# Patient Record
Sex: MALE | Race: BLACK OR AFRICAN AMERICAN | NOT HISPANIC OR LATINO | Employment: UNEMPLOYED | URBAN - METROPOLITAN AREA
[De-identification: names, ages, dates, MRNs, and addresses within clinical notes are randomized per-mention and may not be internally consistent; named-entity substitution may affect disease eponyms.]

---

## 2017-01-21 ENCOUNTER — HOSPITAL ENCOUNTER (OUTPATIENT)
Facility: HOSPITAL | Age: 52
Discharge: HOME | End: 2017-01-23
Attending: EMERGENCY MEDICINE | Admitting: PSYCHIATRY & NEUROLOGY
Payer: MEDICAID

## 2017-01-21 DIAGNOSIS — I65.23 BILATERAL CAROTID ARTERY STENOSIS: ICD-10-CM

## 2017-01-21 DIAGNOSIS — I63.9 ISCHEMIC STROKE: Primary | ICD-10-CM

## 2017-01-21 DIAGNOSIS — I63.9 STROKE: ICD-10-CM

## 2017-01-21 LAB
ABO + RH BLD: NORMAL
ALBUMIN SERPL BCP-MCNC: 3.7 G/DL
ALP SERPL-CCNC: 109 U/L
ALT SERPL W/O P-5'-P-CCNC: 34 U/L
ANION GAP SERPL CALC-SCNC: 12 MMOL/L
AST SERPL-CCNC: 58 U/L
BASOPHILS # BLD AUTO: 0.01 K/UL
BASOPHILS NFR BLD: 0.2 %
BILIRUB SERPL-MCNC: 0.3 MG/DL
BLD GP AB SCN CELLS X3 SERPL QL: NORMAL
BUN SERPL-MCNC: 12 MG/DL
CALCIUM SERPL-MCNC: 8.8 MG/DL
CHLORIDE SERPL-SCNC: 109 MMOL/L
CHOLEST/HDLC SERPL: 4.4 {RATIO}
CO2 SERPL-SCNC: 19 MMOL/L
CREAT SERPL-MCNC: 1.1 MG/DL
CREAT SERPL-MCNC: 1.2 MG/DL (ref 0.5–1.4)
DIFFERENTIAL METHOD: ABNORMAL
EOSINOPHIL # BLD AUTO: 0.1 K/UL
EOSINOPHIL NFR BLD: 1.7 %
ERYTHROCYTE [DISTWIDTH] IN BLOOD BY AUTOMATED COUNT: 11.4 %
EST. GFR  (AFRICAN AMERICAN): >60 ML/MIN/1.73 M^2
EST. GFR  (NON AFRICAN AMERICAN): >60 ML/MIN/1.73 M^2
GLUCOSE SERPL-MCNC: 112 MG/DL
HCT VFR BLD AUTO: 41.1 %
HDL/CHOLESTEROL RATIO: 23 %
HDLC SERPL-MCNC: 161 MG/DL
HDLC SERPL-MCNC: 37 MG/DL
HGB BLD-MCNC: 14.4 G/DL
INR PPP: 1
LDLC SERPL CALC-MCNC: 92.4 MG/DL
LYMPHOCYTES # BLD AUTO: 1.9 K/UL
LYMPHOCYTES NFR BLD: 28.3 %
MCH RBC QN AUTO: 33.2 PG
MCHC RBC AUTO-ENTMCNC: 35 %
MCV RBC AUTO: 95 FL
MONOCYTES # BLD AUTO: 0.6 K/UL
MONOCYTES NFR BLD: 8.7 %
NEUTROPHILS # BLD AUTO: 4.1 K/UL
NEUTROPHILS NFR BLD: 60.9 %
NONHDLC SERPL-MCNC: 124 MG/DL
PLATELET # BLD AUTO: 242 K/UL
PMV BLD AUTO: 10.4 FL
POC PTINR: 1 (ref 0.9–1.2)
POC PTWBT: 12.4 SEC (ref 9.7–14.3)
POCT GLUCOSE: 104 MG/DL (ref 70–110)
POTASSIUM SERPL-SCNC: 3.7 MMOL/L
PROT SERPL-MCNC: 7.7 G/DL
PROTHROMBIN TIME: 10.5 SEC
RBC # BLD AUTO: 4.34 M/UL
SAMPLE: NORMAL
SAMPLE: NORMAL
SODIUM SERPL-SCNC: 140 MMOL/L
TRIGL SERPL-MCNC: 158 MG/DL
TSH SERPL DL<=0.005 MIU/L-ACNC: 1.15 UIU/ML
WBC # BLD AUTO: 6.65 K/UL

## 2017-01-21 PROCEDURE — 99285 EMERGENCY DEPT VISIT HI MDM: CPT | Mod: ,,, | Performed by: EMERGENCY MEDICINE

## 2017-01-21 PROCEDURE — 27000221 HC OXYGEN, UP TO 24 HOURS

## 2017-01-21 PROCEDURE — 93005 ELECTROCARDIOGRAM TRACING: CPT

## 2017-01-21 PROCEDURE — 83036 HEMOGLOBIN GLYCOSYLATED A1C: CPT

## 2017-01-21 PROCEDURE — 82565 ASSAY OF CREATININE: CPT

## 2017-01-21 PROCEDURE — 80053 COMPREHEN METABOLIC PANEL: CPT

## 2017-01-21 PROCEDURE — 99219 PR INITIAL OBSERVATION CARE,LEVL II: CPT | Mod: ,,, | Performed by: PSYCHIATRY & NEUROLOGY

## 2017-01-21 PROCEDURE — 99285 EMERGENCY DEPT VISIT HI MDM: CPT | Mod: 25

## 2017-01-21 PROCEDURE — 85025 COMPLETE CBC W/AUTO DIFF WBC: CPT

## 2017-01-21 PROCEDURE — 85610 PROTHROMBIN TIME: CPT

## 2017-01-21 PROCEDURE — G0378 HOSPITAL OBSERVATION PER HR: HCPCS

## 2017-01-21 PROCEDURE — 96374 THER/PROPH/DIAG INJ IV PUSH: CPT

## 2017-01-21 PROCEDURE — 82962 GLUCOSE BLOOD TEST: CPT

## 2017-01-21 PROCEDURE — 84443 ASSAY THYROID STIM HORMONE: CPT

## 2017-01-21 PROCEDURE — 93010 ELECTROCARDIOGRAM REPORT: CPT | Mod: ,,, | Performed by: INTERNAL MEDICINE

## 2017-01-21 PROCEDURE — 86901 BLOOD TYPING SEROLOGIC RH(D): CPT

## 2017-01-21 PROCEDURE — 80061 LIPID PANEL: CPT

## 2017-01-21 PROCEDURE — 36592 COLLECT BLOOD FROM PICC: CPT

## 2017-01-21 PROCEDURE — 25000003 PHARM REV CODE 250: Performed by: PHYSICIAN ASSISTANT

## 2017-01-21 PROCEDURE — 63600175 PHARM REV CODE 636 W HCPCS: Performed by: PHYSICIAN ASSISTANT

## 2017-01-21 PROCEDURE — 86900 BLOOD TYPING SEROLOGIC ABO: CPT

## 2017-01-21 RX ORDER — THIAMINE HCL 100 MG
100 TABLET ORAL DAILY
Status: DISCONTINUED | OUTPATIENT
Start: 2017-01-22 | End: 2017-01-23 | Stop reason: HOSPADM

## 2017-01-21 RX ORDER — DIAZEPAM 10 MG/2ML
2 INJECTION INTRAMUSCULAR ONCE
Status: COMPLETED | OUTPATIENT
Start: 2017-01-21 | End: 2017-01-21

## 2017-01-21 RX ORDER — LABETALOL HYDROCHLORIDE 5 MG/ML
10 INJECTION, SOLUTION INTRAVENOUS
Status: DISCONTINUED | OUTPATIENT
Start: 2017-01-21 | End: 2017-01-22

## 2017-01-21 RX ORDER — SODIUM CHLORIDE 0.9 % (FLUSH) 0.9 %
3 SYRINGE (ML) INJECTION EVERY 8 HOURS
Status: DISCONTINUED | OUTPATIENT
Start: 2017-01-21 | End: 2017-01-23 | Stop reason: HOSPADM

## 2017-01-21 RX ORDER — HEPARIN SODIUM 5000 [USP'U]/ML
5000 INJECTION, SOLUTION INTRAVENOUS; SUBCUTANEOUS EVERY 8 HOURS
Status: DISCONTINUED | OUTPATIENT
Start: 2017-01-21 | End: 2017-01-23 | Stop reason: HOSPADM

## 2017-01-21 RX ADMIN — Medication 3 ML: at 10:01

## 2017-01-21 RX ADMIN — DIAZEPAM 2 MG: 5 INJECTION, SOLUTION INTRAMUSCULAR; INTRAVENOUS at 09:01

## 2017-01-21 RX ADMIN — HEPARIN SODIUM 5000 UNITS: 5000 INJECTION, SOLUTION INTRAVENOUS; SUBCUTANEOUS at 10:01

## 2017-01-21 NOTE — ED NOTES
Pt placed on cardiac monitor, continuous pulse ox, cycling blood pressures. Side rails up x2, call bell in reach, bed in low position with brake engaged.

## 2017-01-21 NOTE — ED TRIAGE NOTES
Onset left side facial droop and weakness i left arm and leg at 11 am while at work.  Denies h/a . Began to resolve  After 30 minutes. Pt arrives in ER and taken to CT scan. Upon return to room pt has weakness in nleft arm/leg but no drifts. No facial droop noted and AAOx4, with no slurred speech.

## 2017-01-21 NOTE — ASSESSMENT & PLAN NOTE
Tonya Wayne is a 51 y.o. male with a PMHx of HTN with LLE weakness. CT without abnormalities. MRI and MRA pending    Antithrombotics for secondary stroke prevention: Antiplatelets:  Aspirin: 81 mg oral now and daily  Statins for secondary stroke prevention and hyperlipidemia, if present: Atorvastatin- 40 mg oral daily  Aggressive risk factor modification: Hypertension  Rehab Efforts: Physical Therapy, Occupational Therapy and Speech and Language Pathology  Diagnostics: Ordered/Pending Other: MRI, MRA, and echo  VTE Prophylaxis: Heparin 5000 units SQ every 8 hours  BP Parameters: SBP <220  Nursing Orders: Neuro checks- Q4H, Swallowing evaluation by Nursing, Out of bed BID, Avoid Neal catheter, Pneumatic compression device, Stroke Education, Blood glucose target 100-130, Up with assistance  IV Fluids: NS @ 75cc/hr

## 2017-01-21 NOTE — CONSULTS
"[facility]  Vascular Neurology  Comprehensive Stroke Center  Consult Note      Inpatient consult to Vascular (Stroke) Neurology  Consult performed by: EDILBERTO BROUSSARD  Consult ordered by: CARMELITA MENDOZA        Subjective:     History of Present Illness:  Tonya Wayne is a 51 y.o. Male with a PMHx of HTN who presents with a L facial droop and L sided weakness that began 1hr PTA and lasted 20 minutes. Patient was at work when he suddenly felt tired and stood up noticing his L sided was "heavier." He reports that his coworkers noticed some slurred speech. 20 minutes after symptom onset, patient states that his L arm weakness resolved but that his L leg weakness is improved although still persisting.          Past Medical History   Diagnosis Date    Hypertension     Kidney stone      Past Surgical History   Procedure Laterality Date    Ankle fracture surgery Left     Kidney stone removal       No family history on file.  Social History   Substance Use Topics    Smoking status: Never Smoker    Smokeless tobacco: None    Alcohol use 2.4 oz/week     4 Cans of beer per week     Review of patient's allergies indicates:   Allergen Reactions    Iodine and iodide containing products Hives     Medications: I have reviewed the current medication administration record.      (Not in a hospital admission)    Review of Systems   Constitutional: Negative for chills and fever.   HENT: Negative for facial swelling.    Eyes: Negative for redness.   Respiratory: Negative for cough and shortness of breath.    Cardiovascular: Negative for chest pain and palpitations.   Gastrointestinal: Negative for nausea and vomiting.   Genitourinary: Negative for dysuria and hematuria.   Musculoskeletal: Negative for joint swelling.   Skin: Negative for rash.   Neurological: Positive for weakness. Negative for dizziness, facial asymmetry, speech difficulty, numbness and headaches.   Psychiatric/Behavioral: Negative " for agitation and behavioral problems.     Objective:     Vital Signs (Most Recent):  Temp: 98.7 °F (37.1 °C) (01/21/17 1238)  Pulse: 88 (01/21/17 1332)  Resp: 18 (01/21/17 1332)  BP: (!) 149/76 (01/21/17 1332)  SpO2: 98 % (01/21/17 1332)    Vital Signs Range (Last 24H):  Temp:  [98.7 °F (37.1 °C)]   Pulse:  []   Resp:  [18]   BP: (134-149)/()   SpO2:  [94 %-99 %]     Physical Exam   Constitutional: He is oriented to person, place, and time. He appears well-developed and well-nourished. No distress.   HENT:   Head: Normocephalic and atraumatic.   Eyes: EOM are normal. Pupils are equal, round, and reactive to light.   Neck: Normal range of motion.   Cardiovascular: Normal rate and regular rhythm.    Pulmonary/Chest: Effort normal. No respiratory distress.   Abdominal: He exhibits no distension.   Musculoskeletal: Normal range of motion.   Neurological: He is alert and oriented to person, place, and time.   Skin: Skin is warm and dry.   Psychiatric: He has a normal mood and affect.   Vitals reviewed.      Neurological Exam:   LOC: alert and follows requests  Language: No aphasia  Speech: No dysarthria  Orientation: Person, Place, Time  Memory: Recent memory intact, Remote memory intact, Age correct, Month correct  Visual Fields (CN II): Full  EOM (CN III, IV, VI): Full/intact  Pupils (CN III, IV, VI): PERRL  Facial Sensation (CN V): Symmetric  Facial Movement (CN VII): symmetric facial expression  Hearing (CN VIII): intact bilaterally  Motor*: Arm Left:  Normal (5/5), Leg Left:   Paretic:  4/5, Arm Right:   Normal (5/5), Leg Right:   Normal (5/5)  Cerebellar*: Normal limb, Normal gait  , Normal stance  Sensation: intact to light touch, temperature and vibration  Tone: Arm-Left: normal; Leg-Left: normal; Arm-Right: normal; Leg-Right: normal    NIH Stroke Scale:    Level of Consciousness: 0 - alert  LOC Questions: 0 - answers both correctly  LOC Commands: 0 - performs both correctly  Best Gaze: 0 -  normal  Visual: 0 - no visual loss  Facial Palsy: 0 - normal  Motor Left Arm: 0 - no drift  Motor Right Arm: 0 - no drift  Motor Left Le - drift  Motor Right Le - no drift  Limb Ataxia: 0 - absent  Sensory: 0 - normal  Best Language: 0 - no aphasia  Dysarthria: 0 - normal articulation  Extinction and Inattention: 0 - no neglect  NIH Stroke Scale Total: 1  Modified [place] Scale:   Timeline: Prior to symptoms onset  Modified Torito Score: 1 - no significant disability        Laboratory:  CMP:   Recent Labs  Lab 17  1255   CALCIUM 8.8   ALBUMIN 3.7   PROT 7.7      K 3.7   CO2 19*      BUN 12   CREATININE 1.1   ALKPHOS 109   ALT 34   AST 58*   BILITOT 0.3     BMP:   Recent Labs  Lab 17  1255      K 3.7      CO2 19*   BUN 12   CREATININE 1.1   CALCIUM 8.8     CBC:   Recent Labs  Lab 17  1255   WBC 6.65   RBC 4.34*   HGB 14.4   HCT 41.1      MCV 95   MCH 33.2*   MCHC 35.0     Lipid Panel:   Recent Labs  Lab 17  1255   CHOL 161   LDLCALC 92.4   HDL 37*   TRIG 158*     Coagulation:   Recent Labs  Lab 17  1255   INR 1.0     Platelet Aggregation Study: No results for input(s): PLTAGG, PLTAGINTERP, PLTAGREGLACO, ADPPLTAGGREG in the last 168 hours.  Hgb A1C: No results for input(s): HGBA1C in the last 168 hours.  TSH: No results for input(s): TSH in the last 168 hours.    Diagnostic Results:  Brain Imaging: CT Head. Date: 17  Acute Pathology: None        Assessment/Plan:     Patient is a 51 y.o. year old male with:    * Ischemic stroke  Tonya Wayne is a 51 y.o. male with a PMHx of HTN with LLE weakness. CT without abnormalities. MRI and MRA pending    Antithrombotics for secondary stroke prevention: Antiplatelets:  Aspirin: 81 mg oral now and daily  Statins for secondary stroke prevention and hyperlipidemia, if present: Atorvastatin- 40 mg oral daily  Aggressive risk factor modification: Hypertension  Rehab Efforts: Physical Therapy, Occupational  Therapy and Speech and Language Pathology  Diagnostics: Ordered/Pending Other: MRI, MRA, and echo  VTE Prophylaxis: Heparin 5000 units SQ every 8 hours  BP Parameters: SBP <220  Nursing Orders: Neuro checks- Q4H, Swallowing evaluation by Nursing, Out of bed BID, Avoid Neal catheter, Pneumatic compression device, Stroke Education, Blood glucose target 100-130, Up with assistance  IV Fluids: NS @ 75cc/hr        Benign essential hypertension  Stroke risk factor  On lisinopril and coreg at home  D/C home meds for now  SBP <220  labetolol prn      Thrombolysis Candidate? No  1. Contraindications: no significant neurologic deficit, NIH of 1    Interventional Revascularization Candidate?  No; No significant neurological deficit    Research Candidate? No        Mallory Hearn PA-C  Comprehensive Stroke Center  Department of Vascular Neurology   [facility]

## 2017-01-21 NOTE — IP AVS SNAPSHOT
[facility]  [address]  [facility] [address]  Phone: [phone]           Patient Discharge Instructions     Our goal is to set you up for success. This packet includes information on your condition, medications, and your home care. It will help you to care for yourself so you don't get sicker and need to go back to the hospital.     Please ask your nurse if you have any questions.        There are many details to remember when preparing to leave the hospital. Here is what you will need to do:    1. Take your medicine. If you are prescribed medications, review your Medication List in the following pages. You may have new medications to  at the pharmacy and others that you'll need to stop taking. Review the instructions for how and when to take your medications. Talk with your doctor or nurses if you are unsure of what to do.     2. Go to your follow-up appointments. Specific follow-up information is listed in the following pages. Your may be contacted by a transition nurse or clinical provider about future appointments. Be sure we have all of the phone numbers to reach you, if needed. Please contact your provider's office if you are unable to make an appointment.     3. Watch for warning signs. Your doctor or nurse will give you detailed warning signs to watch for and when to call for assistance. These instructions may also include educational information about your condition. If you experience any of warning signs to your health, call your doctor.               [facility]  Unless otherwise directed by your provider, please contact [facility], our nurse care line that is available for 24/7 assistance.     [phone] (toll-free)    Registered nurses in the [facility] provide clinical advisement, health education, appointment booking, and other advisory services.                    ** Verify the list of medication(s) below is accurate and up  to date. Carry this with you in case of emergency. If your medications have changed, please notify your healthcare provider.             Medication List      START taking these medications        Additional Info                      aspirin 81 MG Chew   Refills:  0   Dose:  81 mg    Last time this was given:  81 mg on 1/23/2017  9:00 AM   Instructions:  Take 1 tablet (81 mg total) by mouth once daily.     Begin Date    AM    Noon    PM    Bedtime       atorvastatin 40 MG tablet   Commonly known as:  LIPITOR   Quantity:  90 tablet   Refills:  3   Dose:  40 mg    Last time this was given:  40 mg on 1/23/2017  9:00 AM   Instructions:  Take 1 tablet (40 mg total) by mouth once daily.     Begin Date    AM    Noon    PM    Bedtime         CONTINUE taking these medications        Additional Info                      carvedilol 6.25 MG tablet   Commonly known as:  COREG   Quantity:  60 tablet   Refills:  0   Dose:  6.25 mg    Last time this was given:  6.25 mg on 1/23/2017  9:00 AM   Instructions:  Take 1 tablet (6.25 mg total) by mouth 2 (two) times daily with meals.     Begin Date    AM    Noon    PM    Bedtime       ibuprofen 800 MG tablet   Commonly known as:  ADVIL,MOTRIN   Quantity:  12 tablet   Refills:  0   Dose:  800 mg    Instructions:  Take 1 tablet (800 mg total) by mouth every 6 (six) hours as needed for Pain.     Begin Date    AM    Noon    PM    Bedtime       lisinopril 20 MG tablet   Commonly known as:  PRINIVIL,ZESTRIL   Quantity:  30 tablet   Refills:  0   Dose:  20 mg    Instructions:  Take 1 tablet (20 mg total) by mouth once daily.     Begin Date    AM    Noon    PM    Bedtime       oxycodone-acetaminophen 5-325 mg per tablet   Commonly known as:  PERCOCET   Quantity:  12 tablet   Refills:  0   Dose:  1 tablet    Instructions:  Take 1 tablet by mouth every 6 (six) hours as needed for Pain.     Begin Date    AM    Noon    PM    Bedtime       thiamine 100 MG tablet   Refills:  0   Dose:  100 mg    Last  "time this was given:  100 mg on 1/23/2017  9:00 AM   Instructions:  Take 1 tablet (100 mg total) by mouth once daily.     Begin Date    AM    Noon    PM    Bedtime            Where to Get Your Medications      You can get these medications from any pharmacy     Bring a paper prescription for each of these medications     atorvastatin 40 MG tablet       You don't need a prescription for these medications     aspirin 81 MG Chew                  Please bring to all follow up appointments:    1. A copy of your discharge instructions.  2. All medicines you are currently taking in their original bottles.  3. Identification and insurance card.    Please arrive 15 minutes ahead of scheduled appointment time.    Please call 24 hours in advance if you must reschedule your appointment and/or time.          Discharge Instructions     Future Orders    Activity as tolerated     Diet general     Questions:    Total calories:      Fat restriction, if any:      Protein restriction, if any:      Na restriction, if any:      Fluid restriction:      Additional restrictions:          Primary Diagnosis     Your primary diagnosis was:  Stroke      Admission Information     Date & Time Provider Department CSN    1/21/2017 12:42 PM Eddie Rodriguez MD [facility] [id number]      Care Providers     Provider Role Specialty Primary office phone    Eddie Rodriguez MD Attending Provider Neurology [phone]    Fito Olivera MD Team Attending  Neuro Critical Care [phone]    Gautam Collins MD Team Attending  Neuro Critical Care [phone]    Damon Lima MD Team Attending  Interventional Neurology [phone]    Eddie Rodriguez MD Team Attending  Neurology [phone]      Your Vitals Were     BP Pulse Temp Resp Height Weight    185/101 74 98.4 °F (36.9 °C) (Oral) 18 6' 2" (1.88 m) 102.6 kg (226 lb 3.2 oz)    SpO2 BMI             96% 29.04 kg/m2         Recent Lab Values        4/19/2016 " 1/21/2017                        7:35 AM 12:55 PM          A1C 5.3 5.2          Comment for A1C at 12:55 PM on 1/21/2017:  According to ADA guidelines, hemoglobin A1C <7.0% represents  optimal control in non-pregnant diabetic patients.  Different  metrics may apply to specific populations.   Standards of Medical Care in Diabetes - 2016.  For the purpose of screening for the presence of diabetes:  <5.7%     Consistent with the absence of diabetes  5.7-6.4%  Consistent with increasing risk for diabetes   (prediabetes)  >or=6.5%  Consistent with diabetes  Currently no consensus exists for use of hemoglobin A1C  for diagnosis of diabetes for children.        Allergies as of 1/23/2017        Reactions    Iodine And Iodide Containing Products Hives      Advance Directives     An advance directive is a document which, in the event you are no longer able to make decisions for yourself, tells your healthcare team what kind of treatment you do or do not want to receive, or who you would like to make those decisions for you.  If you do not currently have an advance directive, [facility] encourages you to create one.  For more information call:  [phone]), [phone] ([phone]),  or log on to [email or web address].        Language Assistance Services     ATTENTION: Language assistance services are available, free of charge. Please call [phone].      ATENCIÓN: Si habla español, tiene a denise disposición servicios gratuitos de asistencia lingüística. Llame al [phone].     [facility] Ý: N?u b?n nói Ti?ng Vi?t, có các d?ch v? h? tr? ngôn ng? mi?n phí dành cho b?n. G?i s? [phone].        Stroke Education              [facility] Sign-Up     Activating your [facility] account is as easy as 1-2-3!     1) Visit [email or web address], select Sign Up Now, enter this activation code and your date of birth, then select Next.  [id number]  Expires: 3/9/2017 12:30 PM      2) Create a username and password to  use when you visit [facility] in the future and select a security question in case you lose your password and select Next.    3) Enter your e-mail address and click Sign Up!    Additional Information  If you have questions, please e-mail [email or web address] or call [phone] to talk to our [facility] staff. Remember, [facility] is NOT to be used for urgent needs. For medical emergencies, dial 911.          [facility] complies with applicable Federal civil rights laws and does not discriminate on the basis of race, color, national origin, age, disability, or sex.

## 2017-01-21 NOTE — ED PROVIDER NOTES
Encounter Date: 1/21/2017    SCRIBE #1 NOTE: I, Fara Salvador, am scribing for, and in the presence of,  Dr. Morel. I have scribed the following portions of the note - the EKG reading and the Resident attestation. Other sections scribed: CXR, CT Head.       History     Chief Complaint   Patient presents with    Facial Droop     one hour ago pt noticed left facial droop and dragging left leg, symptoms lasted thirty minutes and have since resolved      Review of patient's allergies indicates:   Allergen Reactions    Iodine and iodide containing products Hives     HPI Comments: Patient is a 52 yo M with a PMH of HTN who presents via EMS with a left facial droop and left-sided weakness. Symptoms initially began while the patient was at work 1 hour prior to admission. He was seen to have left facial droop and left-sided weakness. Pt reported feeling heaviness on the left side. Symptoms lasted less than 20 minutes and resolved by the time EMS arrived. Pt denies prior history of stroke. Denies fever, chills, chest pain, SOB, n/v/d/c.    The history is provided by the patient and the EMS personnel.     Past Medical History   Diagnosis Date    Hypertension     Kidney stone      No past medical history pertinent negatives.  Past Surgical History   Procedure Laterality Date    Ankle fracture surgery Left     Kidney stone removal       History reviewed. No pertinent family history.  Social History   Substance Use Topics    Smoking status: Never Smoker    Smokeless tobacco: None    Alcohol use 2.4 oz/week     4 Cans of beer per week      Comment: occasionally     Review of Systems   Constitutional: Negative for chills and fever.   HENT: Negative for congestion and sore throat.    Eyes: Negative for photophobia and visual disturbance.   Respiratory: Negative for cough and shortness of breath.    Cardiovascular: Negative for chest pain and leg swelling.   Gastrointestinal: Negative for abdominal pain, constipation,  diarrhea, nausea and vomiting.   Genitourinary: Negative for dysuria and hematuria.   Musculoskeletal: Negative for arthralgias and joint swelling.   Skin: Negative for rash and wound.   Neurological: Positive for facial asymmetry and weakness.       Physical Exam   Initial Vitals   BP Pulse Resp Temp SpO2   01/21/17 1238 01/21/17 1238 01/21/17 1238 01/21/17 1238 01/21/17 1238   145/104 102 18 98.7 °F (37.1 °C) 98 %     Physical Exam    Constitutional: He appears well-developed and well-nourished.   HENT:   Head: Normocephalic and atraumatic.   Eyes: EOM are normal. Pupils are equal, round, and reactive to light.   Neck: Normal range of motion.   Cardiovascular: Normal rate, regular rhythm, normal heart sounds and intact distal pulses.   Pulmonary/Chest: Breath sounds normal. No respiratory distress.   Abdominal: Soft. Bowel sounds are normal. He exhibits no distension. There is no tenderness.   Musculoskeletal: Normal range of motion.   Neurological: He is alert and oriented to person, place, and time. He has normal strength. No cranial nerve deficit.   Able to walk but favoring left leg   Skin: Skin is warm and dry.         ED Course   Procedures  Labs Reviewed   CBC W/ AUTO DIFFERENTIAL - Abnormal; Notable for the following:        Result Value    RBC 4.34 (*)     MCH 33.2 (*)     RDW 11.4 (*)     All other components within normal limits   COMPREHENSIVE METABOLIC PANEL - Abnormal; Notable for the following:     CO2 19 (*)     Glucose 112 (*)     AST 58 (*)     All other components within normal limits   LIPID PANEL - Abnormal; Notable for the following:     Triglycerides 158 (*)     HDL 37 (*)     All other components within normal limits   PROTIME-INR   TSH   HEMOGLOBIN A1C   TYPE & SCREEN   POCT GLUCOSE   ISTAT CREATININE   ISTAT PROCEDURE     EKG Readings: (Independently Interpreted)   NSR. LVH. No STEMI.        X-Rays:   Independently Interpreted Readings:   Chest X-Ray: No acute process.    Head CT: No  acute process. No hemorrhage or bleeding. No infarct.      Medical Decision Making:   History:   Old Medical Records: I decided to obtain old medical records.  Clinical Tests:   Lab Tests: Ordered and Reviewed  Radiological Study: Ordered and Reviewed  Medical Tests: Ordered and Reviewed  Other:   I have discussed this case with another health care provider.       APC / Resident Notes:   12:54 pm  Presents with TIA sx. Stroke code activated. Discussed with Dr. Rodriguez.       Scribe Attestation:   Scribe #1: I performed the above scribed service and the documentation accurately describes the services I performed. I attest to the accuracy of the note.    Attending Attestation:   Physician Attestation Statement for Resident:  As the supervising MD   Physician Attestation Statement: I have personally seen and examined this patient.   I agree with the above history. -: This is a 51 year old male going about cleaning when noted by staff that he had left facial weakness and left upper extremity weakness. According to EMS, it has been a hour since onset of symptoms. Apparently when EMS arrived, symptoms had resolved, which has been 20 minutes. No other other symptoms have presented since. Pt denies nausea, vomiting. Pt is right handed.    As the supervising MD I agree with the above PE.   -: Pt has left sided weakness. No facial droop. No upper extremity weakness. Stroke code called.    As the supervising MD I agree with the above treatment, course, plan, and disposition.          Physician Attestation for Scribe:  Physician Attestation Statement for Scribe #1: I, Dr. Morel, reviewed documentation, as scribed by Fara Salvador in my presence, and it is both accurate and complete.         Attending ED Notes:   7:54 PM  Pt brought here by paramedic ambulance from place of work after fellow workers noted left facial weakness with left upper extremity weakness. Upon arrival here in ED, symptoms had resolved. However,  stroke code was called. Pt noted to have continued left lower extremity weakness. Pt was evaluated by neurovascular stroke team and admitted to service for probable CVA.           ED Course     Clinical Impression:   The primary encounter diagnosis was Ischemic stroke. Diagnoses of Stroke and Bilateral carotid artery stenosis were also pertinent to this visit.    Disposition:   Disposition: Admitted  Condition: Serious       Aj Morel MD  01/31/17 4838

## 2017-01-21 NOTE — ED NOTES
Patient identifiers verified and correct for Tonya Wayne.    LOC: The patient is awake, alert and aware of environment with an appropriate affect, the patient is oriented x 3 and speaking appropriately.  APPEARANCE: Patient resting comfortably and in no acute distress, patient is clean and well groomed, patient's clothing is properly fastened.  SKIN: The skin is warm and dry, color consistent with ethnicity, patient has normal skin turgor and moist mucus membranes, skin intact, no breakdown or bruising noted.  MUSCULOSKELETAL: Patient moving all extremities spontaneously, no obvious swelling or deformities noted.Weakness noted in left arm/ leg  RESPIRATORY: Airway is open and patent, respirations are spontaneous, patient has a normal effort and rate, no accessory muscle use noted, bilateral breath sounds clear.  CARDIAC: Patient has a normal rate and regular rhythm, no periphreal edema noted, capillary refill < 3 seconds.  ABDOMEN: Soft and non tender to palpation, no distention noted, normoactive bowel sounds present in all four quadrants.  NEUROLOGIC: PERRL, 3mm bilaterally, eyes open spontaneously, behavior appropriate to situation, follows commands, facial expression symmetrical, bilateral hand grasp equal and even, purposeful motor response noted, normal sensation in all extremities when touched with a finger.No drifts in left side but weaker than rt

## 2017-01-21 NOTE — ED NOTES
"Mallory on stroke team notified patient unable to transport to MRI at this time, as nurse not available for transport based on need in physicians order.  Mallory aware of delay associated and states "that's fine".  "

## 2017-01-21 NOTE — SUBJECTIVE & OBJECTIVE
Past Medical History   Diagnosis Date    Hypertension     Kidney stone      Past Surgical History   Procedure Laterality Date    Ankle fracture surgery Left     Kidney stone removal       No family history on file.  Social History   Substance Use Topics    Smoking status: Never Smoker    Smokeless tobacco: None    Alcohol use 2.4 oz/week     4 Cans of beer per week     Review of patient's allergies indicates:   Allergen Reactions    Iodine and iodide containing products Hives     Medications: I have reviewed the current medication administration record.      (Not in a hospital admission)    Review of Systems   Constitutional: Negative for chills and fever.   HENT: Negative for facial swelling.    Eyes: Negative for redness.   Respiratory: Negative for cough and shortness of breath.    Cardiovascular: Negative for chest pain and palpitations.   Gastrointestinal: Negative for nausea and vomiting.   Genitourinary: Negative for dysuria and hematuria.   Musculoskeletal: Negative for joint swelling.   Skin: Negative for rash.   Neurological: Positive for weakness. Negative for dizziness, facial asymmetry, speech difficulty, numbness and headaches.   Psychiatric/Behavioral: Negative for agitation and behavioral problems.     Objective:     Vital Signs (Most Recent):  Temp: 98.7 °F (37.1 °C) (01/21/17 1238)  Pulse: 88 (01/21/17 1332)  Resp: 18 (01/21/17 1332)  BP: (!) 149/76 (01/21/17 1332)  SpO2: 98 % (01/21/17 1332)    Vital Signs Range (Last 24H):  Temp:  [98.7 °F (37.1 °C)]   Pulse:  []   Resp:  [18]   BP: (134-149)/()   SpO2:  [94 %-99 %]     Physical Exam   Constitutional: He is oriented to person, place, and time. He appears well-developed and well-nourished. No distress.   HENT:   Head: Normocephalic and atraumatic.   Eyes: EOM are normal. Pupils are equal, round, and reactive to light.   Neck: Normal range of motion.   Cardiovascular: Normal rate and regular rhythm.    Pulmonary/Chest:  Effort normal. No respiratory distress.   Abdominal: He exhibits no distension.   Musculoskeletal: Normal range of motion.   Neurological: He is alert and oriented to person, place, and time.   Skin: Skin is warm and dry.   Psychiatric: He has a normal mood and affect.   Vitals reviewed.      Neurological Exam:   LOC: alert and follows requests  Language: No aphasia  Speech: No dysarthria  Orientation: Person, Place, Time  Memory: Recent memory intact, Remote memory intact, Age correct, Month correct  Visual Fields (CN II): Full  EOM (CN III, IV, VI): Full/intact  Pupils (CN III, IV, VI): PERRL  Facial Sensation (CN V): Symmetric  Facial Movement (CN VII): symmetric facial expression  Hearing (CN VIII): intact bilaterally  Motor*: Arm Left:  Normal (5/5), Leg Left:   Paretic:  4/5, Arm Right:   Normal (5/5), Leg Right:   Normal (5/5)  Cerebellar*: Normal limb, Normal gait  , Normal stance  Sensation: intact to light touch, temperature and vibration  Tone: Arm-Left: normal; Leg-Left: normal; Arm-Right: normal; Leg-Right: normal    NIH Stroke Scale:    Level of Consciousness: 0 - alert  LOC Questions: 0 - answers both correctly  LOC Commands: 0 - performs both correctly  Best Gaze: 0 - normal  Visual: 0 - no visual loss  Facial Palsy: 0 - normal  Motor Left Arm: 0 - no drift  Motor Right Arm: 0 - no drift  Motor Left Le - drift  Motor Right Le - no drift  Limb Ataxia: 0 - absent  Sensory: 0 - normal  Best Language: 0 - no aphasia  Dysarthria: 0 - normal articulation  Extinction and Inattention: 0 - no neglect  NIH Stroke Scale Total: 1  Modified Torito Scale:   Timeline: Prior to symptoms onset  Modified Torito Score: 1 - no significant disability        Laboratory:  CMP:   Recent Labs  Lab 17  1255   CALCIUM 8.8   ALBUMIN 3.7   PROT 7.7      K 3.7   CO2 19*      BUN 12   CREATININE 1.1   ALKPHOS 109   ALT 34   AST 58*   BILITOT 0.3     BMP:   Recent Labs  Lab 17  1255      K 3.7       CO2 19*   BUN 12   CREATININE 1.1   CALCIUM 8.8     CBC:   Recent Labs  Lab 01/21/17  1255   WBC 6.65   RBC 4.34*   HGB 14.4   HCT 41.1      MCV 95   MCH 33.2*   MCHC 35.0     Lipid Panel:   Recent Labs  Lab 01/21/17  1255   CHOL 161   LDLCALC 92.4   HDL 37*   TRIG 158*     Coagulation:   Recent Labs  Lab 01/21/17  1255   INR 1.0     Platelet Aggregation Study: No results for input(s): PLTAGG, PLTAGINTERP, PLTAGREGLACO, ADPPLTAGGREG in the last 168 hours.  Hgb A1C: No results for input(s): HGBA1C in the last 168 hours.  TSH: No results for input(s): TSH in the last 168 hours.    Diagnostic Results:  Brain Imaging: CT Head. Date: 01/21/17  Acute Pathology: None

## 2017-01-21 NOTE — ED TRIAGE NOTES
"Pt arrived by [facility] with TIA symptoms. While at work pt started feeling left leg "heaviness" and co-workers noticed pt's left side of face was "drooping." On EMS arrival pt's symptoms has completely resolved. Pt denies headache or any other complaints at this time  "

## 2017-01-22 LAB
ALBUMIN SERPL BCP-MCNC: 3.6 G/DL
ALP SERPL-CCNC: 83 U/L
ALT SERPL W/O P-5'-P-CCNC: 36 U/L
ANION GAP SERPL CALC-SCNC: 9 MMOL/L
AST SERPL-CCNC: 48 U/L
BASOPHILS # BLD AUTO: 0.02 K/UL
BASOPHILS NFR BLD: 0.3 %
BILIRUB SERPL-MCNC: 0.8 MG/DL
BUN SERPL-MCNC: 12 MG/DL
CALCIUM SERPL-MCNC: 9.1 MG/DL
CHLORIDE SERPL-SCNC: 104 MMOL/L
CO2 SERPL-SCNC: 26 MMOL/L
CREAT SERPL-MCNC: 1.1 MG/DL
DIFFERENTIAL METHOD: ABNORMAL
EOSINOPHIL # BLD AUTO: 0.1 K/UL
EOSINOPHIL NFR BLD: 2.1 %
ERYTHROCYTE [DISTWIDTH] IN BLOOD BY AUTOMATED COUNT: 11.4 %
EST. GFR  (AFRICAN AMERICAN): >60 ML/MIN/1.73 M^2
EST. GFR  (NON AFRICAN AMERICAN): >60 ML/MIN/1.73 M^2
ESTIMATED AVG GLUCOSE: 103 MG/DL
GLUCOSE SERPL-MCNC: 93 MG/DL
HBA1C MFR BLD HPLC: 5.2 %
HCT VFR BLD AUTO: 42.7 %
HGB BLD-MCNC: 14.3 G/DL
LYMPHOCYTES # BLD AUTO: 1.3 K/UL
LYMPHOCYTES NFR BLD: 19.7 %
MCH RBC QN AUTO: 32.6 PG
MCHC RBC AUTO-ENTMCNC: 33.5 %
MCV RBC AUTO: 98 FL
MONOCYTES # BLD AUTO: 0.8 K/UL
MONOCYTES NFR BLD: 11.7 %
NEUTROPHILS # BLD AUTO: 4.3 K/UL
NEUTROPHILS NFR BLD: 65.9 %
PLATELET # BLD AUTO: 236 K/UL
PMV BLD AUTO: 10.8 FL
POTASSIUM SERPL-SCNC: 3.7 MMOL/L
PROT SERPL-MCNC: 7.2 G/DL
RBC # BLD AUTO: 4.38 M/UL
SODIUM SERPL-SCNC: 139 MMOL/L
WBC # BLD AUTO: 6.56 K/UL

## 2017-01-22 PROCEDURE — 25000003 PHARM REV CODE 250: Performed by: PSYCHIATRY & NEUROLOGY

## 2017-01-22 PROCEDURE — 85025 COMPLETE CBC W/AUTO DIFF WBC: CPT

## 2017-01-22 PROCEDURE — 25000003 PHARM REV CODE 250: Performed by: PHYSICIAN ASSISTANT

## 2017-01-22 PROCEDURE — 92610 EVALUATE SWALLOWING FUNCTION: CPT

## 2017-01-22 PROCEDURE — 97802 MEDICAL NUTRITION INDIV IN: CPT | Mod: 59

## 2017-01-22 PROCEDURE — 63600175 PHARM REV CODE 636 W HCPCS: Performed by: PHYSICIAN ASSISTANT

## 2017-01-22 PROCEDURE — G8998 SWALLOW D/C STATUS: HCPCS | Mod: CH

## 2017-01-22 PROCEDURE — G8997 SWALLOW GOAL STATUS: HCPCS | Mod: CH

## 2017-01-22 PROCEDURE — 97161 PT EVAL LOW COMPLEX 20 MIN: CPT

## 2017-01-22 PROCEDURE — 99226 PR SUBSEQUENT OBSERVATION CARE,LEVEL III: CPT | Mod: ,,, | Performed by: PSYCHIATRY & NEUROLOGY

## 2017-01-22 PROCEDURE — G8978 MOBILITY CURRENT STATUS: HCPCS | Mod: CH

## 2017-01-22 PROCEDURE — G8996 SWALLOW CURRENT STATUS: HCPCS | Mod: CH

## 2017-01-22 PROCEDURE — G8980 MOBILITY D/C STATUS: HCPCS | Mod: CH

## 2017-01-22 PROCEDURE — G8979 MOBILITY GOAL STATUS: HCPCS | Mod: CH

## 2017-01-22 PROCEDURE — G0378 HOSPITAL OBSERVATION PER HR: HCPCS

## 2017-01-22 PROCEDURE — 36415 COLL VENOUS BLD VENIPUNCTURE: CPT

## 2017-01-22 PROCEDURE — 80053 COMPREHEN METABOLIC PANEL: CPT

## 2017-01-22 PROCEDURE — G8987 SELF CARE CURRENT STATUS: HCPCS | Mod: CH

## 2017-01-22 PROCEDURE — G8988 SELF CARE GOAL STATUS: HCPCS | Mod: CH

## 2017-01-22 PROCEDURE — G8989 SELF CARE D/C STATUS: HCPCS | Mod: CH

## 2017-01-22 PROCEDURE — 97165 OT EVAL LOW COMPLEX 30 MIN: CPT

## 2017-01-22 RX ORDER — NAPROXEN SODIUM 220 MG/1
81 TABLET, FILM COATED ORAL DAILY
Status: DISCONTINUED | OUTPATIENT
Start: 2017-01-22 | End: 2017-01-23 | Stop reason: HOSPADM

## 2017-01-22 RX ORDER — MIDAZOLAM HYDROCHLORIDE 1 MG/ML
3 INJECTION INTRAMUSCULAR; INTRAVENOUS ONCE
Status: COMPLETED | OUTPATIENT
Start: 2017-01-22 | End: 2017-01-22

## 2017-01-22 RX ORDER — LABETALOL HYDROCHLORIDE 5 MG/ML
10 INJECTION, SOLUTION INTRAVENOUS
Status: DISCONTINUED | OUTPATIENT
Start: 2017-01-22 | End: 2017-01-22

## 2017-01-22 RX ORDER — CARVEDILOL 6.25 MG/1
6.25 TABLET ORAL 2 TIMES DAILY WITH MEALS
Status: DISCONTINUED | OUTPATIENT
Start: 2017-01-22 | End: 2017-01-23 | Stop reason: HOSPADM

## 2017-01-22 RX ORDER — MIDAZOLAM HYDROCHLORIDE 1 MG/ML
2 INJECTION INTRAMUSCULAR; INTRAVENOUS ONCE
Status: DISCONTINUED | OUTPATIENT
Start: 2017-01-22 | End: 2017-01-22

## 2017-01-22 RX ORDER — LABETALOL HYDROCHLORIDE 5 MG/ML
10 INJECTION, SOLUTION INTRAVENOUS
Status: DISCONTINUED | OUTPATIENT
Start: 2017-01-22 | End: 2017-01-23 | Stop reason: HOSPADM

## 2017-01-22 RX ORDER — ATORVASTATIN CALCIUM 20 MG/1
40 TABLET, FILM COATED ORAL DAILY
Status: DISCONTINUED | OUTPATIENT
Start: 2017-01-22 | End: 2017-01-23 | Stop reason: HOSPADM

## 2017-01-22 RX ADMIN — Medication 100 MG: at 08:01

## 2017-01-22 RX ADMIN — Medication 3 ML: at 02:01

## 2017-01-22 RX ADMIN — CARVEDILOL 6.25 MG: 6.25 TABLET, FILM COATED ORAL at 08:01

## 2017-01-22 RX ADMIN — CARVEDILOL 6.25 MG: 6.25 TABLET, FILM COATED ORAL at 06:01

## 2017-01-22 RX ADMIN — ATORVASTATIN CALCIUM 40 MG: 20 TABLET, FILM COATED ORAL at 12:01

## 2017-01-22 RX ADMIN — HEPARIN SODIUM 5000 UNITS: 5000 INJECTION, SOLUTION INTRAVENOUS; SUBCUTANEOUS at 02:01

## 2017-01-22 RX ADMIN — MIDAZOLAM HYDROCHLORIDE 2 MG: 1 INJECTION, SOLUTION INTRAMUSCULAR; INTRAVENOUS at 10:01

## 2017-01-22 RX ADMIN — HEPARIN SODIUM 5000 UNITS: 5000 INJECTION, SOLUTION INTRAVENOUS; SUBCUTANEOUS at 09:01

## 2017-01-22 RX ADMIN — Medication 3 ML: at 06:01

## 2017-01-22 RX ADMIN — HEPARIN SODIUM 5000 UNITS: 5000 INJECTION, SOLUTION INTRAVENOUS; SUBCUTANEOUS at 06:01

## 2017-01-22 RX ADMIN — ASPIRIN 81 MG CHEWABLE TABLET 81 MG: 81 TABLET CHEWABLE at 12:01

## 2017-01-22 RX ADMIN — Medication 3 ML: at 10:01

## 2017-01-22 NOTE — PLAN OF CARE
Problem: Patient Care Overview  Goal: Plan of Care Review  Outcome: Outcome(s) achieved Date Met:  01/22/17  OT eval completed.  No goals set.  Discharge OT.  COSME Noriega 1/22/2017

## 2017-01-22 NOTE — NURSING TRANSFER
Nursing Transfer Note      Pt arrived from er/MRI via stretcher accompanied by transporter.telemetry applied upon arrival,NSR.aaox4.resp even and nonlabored.pt was unable to tolerate the MRI due to claustrophobia,despite receiving iv valium.pt reports the numbness has subsided.lue and lle remains weaker than right.speech clear.following all commands.perrl.sl intact to lac.safety precautions implemented.bed in low position,rails up x2.call bell in reach.

## 2017-01-22 NOTE — PLAN OF CARE
Problem: Physical Therapy Goal  Goal: Physical Therapy Goal  Outcome: Outcome(s) achieved Date Met:  01/22/17     PT Evaluation complete. Recommending Home with no PT needs upon D/C.     Mirtha Henson, PT, DPT  [phone]  10/5/2016

## 2017-01-22 NOTE — PT/OT/SLP EVAL
"Physical Therapy  Evaluation/Discharge Summary    Tonya Wayne   MRN: [id number]   Admitting Diagnosis: Ischemic stroke    PT Received On: 17  PT Start Time: 722     PT Stop Time: 735    PT Total Time (min): 13 min       Billable Minutes:  Evaluation 13    Diagnosis: Ischemic stroke    Past Medical History   Diagnosis Date    Hypertension     Kidney stone       Past Surgical History   Procedure Laterality Date    Ankle fracture surgery Left     Kidney stone removal       Referring physician: Michael  Date referred to PT: 2017    General Precautions: Standard, fall    Do you have any cultural, spiritual, Anabaptism conflicts, given your current situation?: Jewish    Patient History:  Lives With: alone  Living Arrangements: house  Transportation Available: family or friend will provide  Living Environment Comment: Pt lives in a 2SH with a threshold to enter in [place]. 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 Pt reports he is active at work, but has no hobbies.  Equipment Currently Used at Home: none  DME owned (not currently used): none    Previous Level of Function:  Ambulation Skills: independent  Transfer Skills: independent  ADL Skills: independent  Work/Leisure Activity: independent    Subjective:  Communicated with RN (Carmen) prior to session.    Chief Complaint: "Are they going to let me go home soon?"  Patient goals: None stated    Pain Ratin/10   Pain Rating Post-Intervention: 0/10    Objective:   Patient found with: telemetry     Cognitive Exam:  Oriented to: Person, Place, Time and Situation    Follows Commands/attention: Follows multistep  commands  Communication: clear/fluent; delayed repsonses  Safety awareness/insight to disability: intact    Physical Exam:  Postural examination/scapula alignment: Rounded shoulder, Head forward and Posterior pelvic tilt    Skin integrity: Thin and Dry  Edema: None noted    Sensation:   Impaired to L sole of " "foot; per pt L foot has "tingling". Intact proprioception and light touch to B LE upon testing     Lower Extremity Range of Motion:  Right Lower Extremity: WFL  Left Lower Extremity: WFL    Lower Extremity Strength: Pt demo poor proximal hip strength of 3+/5 B; Quad of 4/5 B; and 4/5 DF B; No focal weakness noted.  Right Lower Extremity: WFL  Left Lower Extremity: WFL     Gross motor coordination: WFL    Functional Mobility:  Bed Mobility: Independent    Transfers: Independent from EOB x2 trials    Gait:   Gait Distance: x30 feet; no gait instability noted or increased sway. Pt reports baseline ambulation; however, indicates tingling to L foot is noticeable.  Gait Assistive Device: No device  Gait Pattern: reciprocal  Gait Deviation(s):  (none)    Stairs: Pt declined stair trials despite encouragement; preference to remain in room.    Balance:   Static Sit: GOOD-: Takes MODERATE challenges from all directions but inconsistently  Dynamic Sit: GOOD-: Maintains balance through MODERATE excursions of active trunk movement,     Static Stand: GOOD-: Takes MODERATE challenges from all directions inconsistently  Dynamic stand: GOOD: Needs SUPERVISION only during gait and able to self right with moderate  LOB    Therapeutic Activities and Exercises:  PT reviewed PT role/POC, recs, and stroke s/s with pt. Pt educated on FAST acronym. Questions/concerns addressed within PT scope of practice. Pt safe to ambulate to bathroom with supervision only.    AM-PAC 6 CLICK MOBILITY  How much help from another person does this patient currently need?   1 = Unable, Total/Dependent Assistance  2 = A lot, Maximum/Moderate Assistance  3 = A little, Minimum/Contact Guard/Supervision  4 = None, Modified [place]/Independent    Turning over in bed (including adjusting bedclothes, sheets and blankets)?: 4  Sitting down on and standing up from a chair with arms (e.g., wheelchair, bedside commode, etc.): 4  Moving from lying on back to sitting " on the side of the bed?: 4  Moving to and from a bed to a chair (including a wheelchair)?: 4  Need to walk in hospital room?: 4  Climbing 3-5 steps with a railing?: 4  Total Score: 24     AM-PAC Raw Score CMS G-Code Modifier Level of Impairment Assistance   6 % Total / Unable   7 - 9 CM 80 - 100% Maximal Assist   10 - 14 CL 60 - 80% Moderate Assist   15 - 19 CK 40 - 60% Moderate Assist   20 - 22 CJ 20 - 40% Minimal Assist   23 CI 1-20% SBA / CGA   24 CH 0% Independent/ Mod I     Patient left HOB elevated with all lines intact, call button in reach and RN notified.    Assessment:   Tonya Wayne is a 51 y.o. male with a medical diagnosis of Ischemic stroke. PTA pt was (I) with mobility and ADLs; working and driving. Upon evaluation, pt presents with generalized B LE weakness and L LE sensory deficits. Pt reports baseline and has no concerns regarding return home following PT Evaluation. Pt current (I) with bed mobility and transfers; will benefit from supervision for ambulation to bathroom 2/2 unfamiliar environment. Patient with no further acute PT needs. D/C Acute PT services 1/22/2017.    Rehab identified problem list/impairments: Rehab identified problem list/impairments: impaired sensation    Rehab potential is good.    Activity tolerance: Good    Discharge recommendations: Discharge Facility/Level Of Care Needs: home     Barriers to discharge: Barriers to Discharge: None    Equipment recommendations: Equipment Needed After Discharge: none     PLAN:    D/C Acute PT services 1/22/2017. Home; no PT needs.  Plan of Care reviewed with: patient     Mirtha CONCEPCION Henson, PT, DPT  [phone]  1/22/2017

## 2017-01-22 NOTE — PT/OT/SLP EVAL
Occupational Therapy  Evaluation/Discharge Summary    Tonya Wayne   MRN: [id number]   Admitting Diagnosis: Ischemic stroke    OT Date of Treatment: 17   OT Start Time: 928  OT Stop Time: 938  OT Total Time (min): 10 min    Billable Minutes:  Evaluation 10    Diagnosis: Ischemic stroke   Left facial droop, LUE weakness, LLE numbness, slurred speech    Past Medical History   Diagnosis Date    Hypertension     Kidney stone       Past Surgical History   Procedure Laterality Date    Ankle fracture surgery Left     Kidney stone removal         Referring physician: MD Michael  Date referred to OT:     General Precautions: Standard, fall, aspiration (cardiac)    Do you have any cultural, spiritual, Mormon conflicts, given your current situation?: Scientologist     Patient History:  Living Environment  Living Environment Comment: Pt resides with friend in [facility] in 2 story house with bed & bath on 1st floor and 1 step to enter.  Pt has no DME.  Pt has bathtub for bathing.  Equipment Currently Used at Home: none    Prior level of function:   Bed Mobility/Transfers: independent  Grooming: independent  Bathing: independent  Upper Body Dressing: independent  Lower Body Dressing: independent  Toileting: independent  Driving License: Yes  Occupation: Full time employment  Type of Occupation: housekeeping  Leisure and Hobbies: just relaxing     Dominant hand: right    Subjective:  Communicated with RN prior to session.  Pt with no complaints.  Chief Complaint: none  Patient/Family stated goals: none stated    Pain Ratin/10  Pain Rating Post-Intervention: 0/10    Objective:  Patient found with: telemetry    Cognitive Exam:  Oriented to: Person, Place, Time and Situation  Follows Commands/attention: Follows multistep  commands  Communication: clear/fluent  Memory:  No Deficits noted  Safety awareness/insight to disability: intact  Coping skills/emotional control: Appropriate to  "situation    Visual/perceptual:  Intact    Physical Exam:  Postural examination/scapula alignment: Rounded shoulder  Skin integrity: Visible skin intact  Edema: None noted BUE    Sensation:   Intact  light/touch BUE    Upper Extremity Range of Motion:  Right Upper Extremity: WNL  Left Upper Extremity: WNL    Upper Extremity Strength:  Right Upper Extremity: WFL  Left Upper Extremity: WFL   Strength: WFL    Fine motor coordination:   Intact  Left hand thumb/finger opposition skills and Right hand thumb/finger opposition skills    Gross motor coordination: WFL    Functional Mobility:  Bed Mobility:  Supine to Sit: Independent    Transfers:  Sit <> Stand Assistance: Independent (from EOB)  Sit <> Stand Assistive Device: No Assistive Device    Functional Ambulation: Independent with functional mobility    Activities of Daily Living:     UE Dressing Level of Assistance: Independent  LE Dressing Level of Assistance: Modified independent  Grooming Position: Standing  Grooming Level of Assistance: Independent  Toileting Where Assessed: Toilet  Toileting Level of Assistance: Independent     AM-PAC 6 CLICK ADL  How much help from another person does this patient currently need?  1 = Unable, Total/Dependent Assistance  2 = A lot, Maximum/Moderate Assistance  3 = A little, Minimum/Contact Guard/Supervision  4 = None, Modified [place]/Independent    Putting on and taking off regular lower body clothing? : 4  Bathing (including washing, rinsing, drying)?: 4  Toileting, which includes using toilet, bedpan, or urinal? : 4  Putting on and taking off regular upper body clothing?: 4  Taking care of personal grooming such as brushing teeth?: 4  Eating meals?: 4  Total Score: 24    AM-PAC Raw Score CMS "G-Code Modifier Level of Impairment Assistance   6 % Total / Unable   7 - 9 CM 80 - 100% Maximal Assist   10 - 14 CL 60 - 80% Moderate Assist   15 - 19 CK 40 - 60% Moderate Assist   20 - 22 CJ 20 - 40% Minimal Assist   23 " CI 1-20% SBA / CGA   24  0% Independent/ Mod I       Patient left in bathroom with RN notified and white board updated.    Assessment:  Tonya Wayne is a 51 y.o. male with a medical diagnosis of Ischemic stroke and presents with no focal deficits affecting ADL's therefore no further OT services needed on acute..    Activity tolerance: Good    Discharge recommendations: Discharge Facility/Level Of Care Needs: home     Barriers to discharge: Barriers to Discharge: None    Equipment recommendations: none     PLAN:  Discharge OT on acute.  No further OT needed.  Plan of Care reviewed with: patient    OT G-codes  Functional Assessment Tool Used: [facility]  Score: 24  Functional Limitation: Self care  Self Care Current Status ():   Self Care Goal Status ():   Self Care Discharge Status ():     COSME Noriega  01/22/2017

## 2017-01-22 NOTE — PLAN OF CARE
Problem: SLP Goal  Goal: SLP Goal  Bedside swallow eval completed. Continue with regular diet and thin liquids.      TERRY Huynh, CCC-SLP  1/22/2017

## 2017-01-22 NOTE — PLAN OF CARE
Problem: Patient Care Overview  Goal: Plan of Care Review  Outcome: Ongoing (interventions implemented as appropriate)  Recommendations  Encourage good PO intake. RD to monitor.     Full assessment completed. See RD Consult Note 1/22/17.

## 2017-01-22 NOTE — PROGRESS NOTES
Spoke to Alfredo DIAZ regarding MRI.wants pt to try again to complete the test after receiving iv versed. There will be a nurse in MRI in the am who will be able to administer the versed.MRI will be attempted again in the am.pt continues to verbalize that he does not think he will be able to tolerate.

## 2017-01-22 NOTE — PT/OT/SLP EVAL
"Speech Language Pathology  Evaluation    Tonya Wayne   MRN: [id number]   Admitting Diagnosis: Ischemic stroke    Diet recommendations: Solid Diet Level: Regular  Liquid Diet Level: Thin     SLP Treatment Date: 17  Speech Start Time: 901     Speech Stop Time: 909     Speech Total (min): 8 min       TREATMENT BILLABLE MINUTES:  Eval Swallow and Oral Function 8    Diagnosis: Ischemic stroke      Past Medical History   Diagnosis Date    Hypertension     Kidney stone      Past Surgical History   Procedure Laterality Date    Ankle fracture surgery Left     Kidney stone removal         Has the patient been evaluated by SLP for swallowing? : Yes  Keep patient NPO?: No   General Precautions: Standard, fall          Social Hx: Patient lives with a friend    Prior diet: reg/thin        Subjective:  " ready to go home"  Patient goals: go home    Pain Ratin/10              Pain Rating Post-Intervention: 0/10    Objective:        Oral Musculature Evaluation  Oral Musculature: WFL  Dentition: present and adequate  Mucosal Quality: good  Mandibular Strength and Mobility: WFL  Oral Labial Strength and Mobility: WFL  Buccal Strength and Mobility: WFL  Volitional Cough: strong  Voice Prior to PO Intake: clear     Bedside Swallow Eval:  Consistencies Assessed: Thin liquids cup and straw and Solids cracker  Oral Phase: WFL  Pharyngeal Phase: no overt clinical signs/symptoms of pharyngeal dysphagia    Additional Treatment:     white board updated  FIM:  Social Interaction: 7 Complete [place]--The patient interacts appropriately with staff, other patients, and family members (e.g., controls temper, accepts criticism, is aware that words and actions have an impact on others), and does not require medication for                            Assessment:  Tonya Wayne is a 51 y.o. male with a medical diagnosis of Ischemic stroke and presents with no swallowing deficits.           Discharge recommendations: Discharge " Facility/Level Of Care Needs:  (TBD after eval; likely home)     Goals:   SLP Goals        Problem: SLP Goal    Goal Priority Disciplines Outcome   SLP Goal     SLP               Plan:   Patient to be seen Therapy Frequency: 5 x/week   Plan of Care expires:    Plan of Care reviewed with: patient  SLP Follow-up?: Yes              TERRY Huynh, CCC-SLP  01/22/2017

## 2017-01-22 NOTE — PROGRESS NOTES
Pt arrived to MRI on continuous tele, HR 84, pt on RA, pt alert, tele room notified, pt has severe claustrophobia & anxiety about MRI, 2nd attempt @ MRI, PA ordered versed 3mg IV to be given in MRI, versed 2mg iv given as an anxiolytic & as protocol & policy for non ICU patients, PA paged, pt tolerating MRI well, will continue with MRI as tolerated per pt

## 2017-01-22 NOTE — PROGRESS NOTES
Pt unable to complete MRI,Spoke with ty AGUILA about premedicating pt prior to MRI & versed to be given when pt arrives to MRI if test is still needed, tele applied, pt stable & alert

## 2017-01-22 NOTE — PLAN OF CARE
Problem: Patient Care Overview  Goal: Plan of Care Review  Outcome: Ongoing (interventions implemented as appropriate)  Pt progressing towards goals.aaox4.slight weakness noted to lue and lle.numbness and slurred speech have resolved.remains on telemetry,NSR.safety precautions maintained.pt free of falls or injuries.will attempt the MRI again today with iv versed.pt has claustrophobia.continue to monitor neuro checks q4hrs.continue plan of care.

## 2017-01-23 VITALS
DIASTOLIC BLOOD PRESSURE: 93 MMHG | HEART RATE: 75 BPM | TEMPERATURE: 98 F | OXYGEN SATURATION: 97 % | HEIGHT: 74 IN | BODY MASS INDEX: 29.03 KG/M2 | SYSTOLIC BLOOD PRESSURE: 162 MMHG | RESPIRATION RATE: 18 BRPM | WEIGHT: 226.19 LBS

## 2017-01-23 LAB
ALBUMIN SERPL BCP-MCNC: 3.3 G/DL
ALP SERPL-CCNC: 96 U/L
ALT SERPL W/O P-5'-P-CCNC: 33 U/L
ANION GAP SERPL CALC-SCNC: 6 MMOL/L
AST SERPL-CCNC: 33 U/L
BASOPHILS # BLD AUTO: 0.01 K/UL
BASOPHILS NFR BLD: 0.2 %
BILIRUB SERPL-MCNC: 0.5 MG/DL
BUN SERPL-MCNC: 15 MG/DL
CALCIUM SERPL-MCNC: 9.1 MG/DL
CHLORIDE SERPL-SCNC: 107 MMOL/L
CO2 SERPL-SCNC: 27 MMOL/L
CREAT SERPL-MCNC: 1.2 MG/DL
DIASTOLIC DYSFUNCTION: NO
DIFFERENTIAL METHOD: ABNORMAL
EOSINOPHIL # BLD AUTO: 0.1 K/UL
EOSINOPHIL NFR BLD: 2.3 %
ERYTHROCYTE [DISTWIDTH] IN BLOOD BY AUTOMATED COUNT: 11.4 %
EST. GFR  (AFRICAN AMERICAN): >60 ML/MIN/1.73 M^2
EST. GFR  (NON AFRICAN AMERICAN): >60 ML/MIN/1.73 M^2
ESTIMATED PA SYSTOLIC PRESSURE: 19
GLOBAL PERICARDIAL EFFUSION: NORMAL
GLUCOSE SERPL-MCNC: 119 MG/DL
HCT VFR BLD AUTO: 41.5 %
HGB BLD-MCNC: 13.9 G/DL
LYMPHOCYTES # BLD AUTO: 1.5 K/UL
LYMPHOCYTES NFR BLD: 27.8 %
MCH RBC QN AUTO: 33.1 PG
MCHC RBC AUTO-ENTMCNC: 33.5 %
MCV RBC AUTO: 99 FL
MITRAL VALVE MOBILITY: NORMAL
MITRAL VALVE REGURGITATION: NORMAL
MONOCYTES # BLD AUTO: 0.8 K/UL
MONOCYTES NFR BLD: 14.3 %
NEUTROPHILS # BLD AUTO: 3 K/UL
NEUTROPHILS NFR BLD: 55.2 %
PLATELET # BLD AUTO: 237 K/UL
PMV BLD AUTO: 10.8 FL
POTASSIUM SERPL-SCNC: 4 MMOL/L
PROT SERPL-MCNC: 6.9 G/DL
RBC # BLD AUTO: 4.2 M/UL
RETIRED EF AND QEF - SEE NOTES: 65 (ref 55–65)
SODIUM SERPL-SCNC: 140 MMOL/L
WBC # BLD AUTO: 5.33 K/UL

## 2017-01-23 PROCEDURE — 92523 SPEECH SOUND LANG COMPREHEN: CPT

## 2017-01-23 PROCEDURE — 93306 TTE W/DOPPLER COMPLETE: CPT

## 2017-01-23 PROCEDURE — 93880 EXTRACRANIAL BILAT STUDY: CPT | Performed by: SURGERY

## 2017-01-23 PROCEDURE — 25000003 PHARM REV CODE 250: Performed by: PHYSICIAN ASSISTANT

## 2017-01-23 PROCEDURE — 80053 COMPREHEN METABOLIC PANEL: CPT

## 2017-01-23 PROCEDURE — 36415 COLL VENOUS BLD VENIPUNCTURE: CPT

## 2017-01-23 PROCEDURE — G0378 HOSPITAL OBSERVATION PER HR: HCPCS

## 2017-01-23 PROCEDURE — 85025 COMPLETE CBC W/AUTO DIFF WBC: CPT

## 2017-01-23 PROCEDURE — 25000003 PHARM REV CODE 250: Performed by: PSYCHIATRY & NEUROLOGY

## 2017-01-23 PROCEDURE — 93306 TTE W/DOPPLER COMPLETE: CPT | Mod: 26,,, | Performed by: INTERNAL MEDICINE

## 2017-01-23 RX ORDER — NAPROXEN SODIUM 220 MG/1
81 TABLET, FILM COATED ORAL DAILY
Refills: 0 | COMMUNITY
Start: 2017-01-23 | End: 2018-01-23

## 2017-01-23 RX ORDER — ATORVASTATIN CALCIUM 40 MG/1
40 TABLET, FILM COATED ORAL DAILY
Qty: 90 TABLET | Refills: 0 | Status: SHIPPED | OUTPATIENT
Start: 2017-01-23 | End: 2017-01-23

## 2017-01-23 RX ORDER — LISINOPRIL 10 MG/1
10 TABLET ORAL DAILY
Status: DISCONTINUED | OUTPATIENT
Start: 2017-01-23 | End: 2017-01-23 | Stop reason: HOSPADM

## 2017-01-23 RX ORDER — ATORVASTATIN CALCIUM 40 MG/1
40 TABLET, FILM COATED ORAL DAILY
Qty: 90 TABLET | Refills: 0 | Status: SHIPPED | OUTPATIENT
Start: 2017-01-23 | End: 2017-11-04 | Stop reason: ALTCHOICE

## 2017-01-23 RX ORDER — ATORVASTATIN CALCIUM 40 MG/1
40 TABLET, FILM COATED ORAL DAILY
Qty: 90 TABLET | Refills: 3 | Status: SHIPPED | OUTPATIENT
Start: 2017-01-23 | End: 2017-01-23

## 2017-01-23 RX ADMIN — ASPIRIN 81 MG CHEWABLE TABLET 81 MG: 81 TABLET CHEWABLE at 09:01

## 2017-01-23 RX ADMIN — ATORVASTATIN CALCIUM 40 MG: 20 TABLET, FILM COATED ORAL at 09:01

## 2017-01-23 RX ADMIN — Medication 3 ML: at 06:01

## 2017-01-23 RX ADMIN — Medication 100 MG: at 09:01

## 2017-01-23 RX ADMIN — HEPARIN SODIUM 5000 UNITS: 5000 INJECTION, SOLUTION INTRAVENOUS; SUBCUTANEOUS at 06:01

## 2017-01-23 RX ADMIN — CARVEDILOL 6.25 MG: 6.25 TABLET, FILM COATED ORAL at 09:01

## 2017-01-23 NOTE — PROGRESS NOTES
Discharge instructions provided to and gone over with patient. Tele monitor removed. IV removed, tip intact. Waiting on prescription for atorvastatin to be delivered.

## 2017-01-23 NOTE — ASSESSMENT & PLAN NOTE
Tonya Wayne is a 51 y.o. male with a PMHx of HTN with LLE weakness. CT without abnormalities. MRI and MRA with R corona radiata/BG infarct & unremarkable MRA brain. Unable to tolerate whole MRA to get MRA neck with contrast. Pending ECHO.    Antithrombotics for secondary stroke prevention: Antiplatelets:  Aspirin: 81 mg oral now and daily  Statins for secondary stroke prevention and hyperlipidemia, if present: Atorvastatin- 40 mg oral daily  Aggressive risk factor modification: Hypertension  Rehab Efforts: Physical Therapy, Occupational Therapy and Speech and Language Pathology  Diagnostics: Ordered/Pending Other: Echo, ordered Carotid US as alternative to MRA neck   VTE Prophylaxis: Heparin 5000 units SQ every 8 hours  BP Parameters: SBP <200  Nursing Orders: Neuro checks- Q4H, Swallowing evaluation by Nursing, Out of bed BID, Avoid Neal catheter, Pneumatic compression device, Stroke Education, Blood glucose target 100-130, Up with assistance  IV Fluids: NS @ 75cc/hr

## 2017-01-23 NOTE — PT/OT/SLP EVAL
Speech Language Pathology Evaluation  Discharge Summary    Tonya Wayne   MRN: [id number]   Admitting Diagnosis: Ischemic stroke    Diet recommendations: Solid Diet Level: Regular  Liquid Diet Level: Thin Feed only when awake/alert, HOB to 90 degrees, Small bites/sips, Alternating bites/sips and 1 bite/sip at a time    SLP Treatment Date: 17  Speech Start Time: 836     Speech Stop Time: 0844     Speech Total (min): 8 min       TREATMENT BILLABLE MINUTES:  Eval 8     Diagnosis: Ischemic stroke      Past Medical History   Diagnosis Date    Hypertension     Kidney stone      Past Surgical History   Procedure Laterality Date    Ankle fracture surgery Left     Kidney stone removal         Has the patient been evaluated by SLP for swallowing? : Yes  Keep patient NPO?: No   General Precautions: Standard, aspiration, fall            Subjective:  Awake/alert      Pain Ratin/10  Pain Rating Post-Intervention: 0/10    Objective:        Oral Musculature Evaluation  Oral Musculature: WFL  Dentition: present and adequate  Mucosal Quality: good  Mandibular Strength and Mobility: WFL  Oral Labial Strength and Mobility: [facility]  Buccal Strength and Mobility: [facility]  Volitional Cough: strong  Voice Prior to PO Intake: clear     Cognitive Status:  Behavioral Observations: alert, appropriate and cooperative-  Memory and Orientation: oriented x4, immediate 5 numbers/5 words, delayed 3/3 ind'ly  Attention: WFL.  Problem Solving: Conclusions: 100%  Reasoning:  Compare/contrast 100%  Organization: categories 100%   Sequencin/4 word set       Pragmatics: [facility]  Executive Function: [facility]    Language: [facility]    Auditory Comprehension: answers yes/no questions complex 100%  and able to follow commands complex 100%    Verbal Expression: [facility]    Motor Speech: [facility]    Voice: [facility]    Augmentative Alternative Communication: no    Reading: [facility]    Writing: [facility]    Visual-Spatial: [facility]    Additional Treatment:    Pt tolerated thin liquids, [place]  toast, and eggs during eval without overt s/s of aspiration    FIM:  Social Interaction: 7 [place]--The patient interacts appropriately with staff, other patients, and family members (e.g., controls temper, accepts criticism, is aware that words and actions have an impact on others), and does not require medication for   Problem Solvin Complete [place]--The patient consistently recognizes problems when present, makes appropriate decisions, initiates and carries out a sequence of steps to solve complex problems until the task is completed, and self-corrects if errors are made.   Comprehension: 7 [place]--The patient understand complex or abstract directions and conversation, and understand either spoken or written language (not necessarily English).   Expression: 7 [place]--The patient expresses complex or abstract ideas clearly and fluently (not necessarily in English).   Memory: 7 [place]--The patient recognizes people frequently encountered, remebers daily routines, and executes requests of others without need for repetition.     Assessment:  Tonya Wayne is a 51 y.o. male with cognitive, speech and language aspects deemed WFL         Discharge recommendations: Discharge Facility/Level Of Care Needs: home     Goals:   SLP Goals     Not on file      Multidisciplinary Problems (Resolved)        Problem: SLP Goal    Goal Priority Disciplines Outcome   SLP Goal   (Resolved)     SLP Outcome(s) achieved              Plan:   Plan of Care reviewed with: patient  SLP Follow-up?: No              Madeline Jewell CCC-SLP   Speech Language Pathologist  Pager [phone]  2017

## 2017-01-23 NOTE — PLAN OF CARE
Problem: Patient Care Overview  Goal: Plan of Care Review  Outcome: Ongoing (interventions implemented as appropriate)  Safety precautions maintained. Side rails up x2. Bed in lowest position. Call bell within reach. Tele monitoring maintained. No complaints of pain. cva deficits includes weakness to left side, intermitten numbness to left foot. Blood pressure remains under control. Will continue to monitor.

## 2017-01-23 NOTE — PROGRESS NOTES
[facility]  Vascular Neurology  Comprehensive Stroke Center  Progress Note      Neurologic Chief Complaint: L facial droop and left sided weakness    Subjective:     Interval History: Patient is seen for follow-up neurological assessment and treatment recommendations: MAURICIO. Able to obtain majority of MRI this morning.     HPI, Past Medical, Family, and Social History remains the same as documented in the initial encounter.     Review of Systems   Constitutional: Negative for fever.   Eyes: Negative for visual disturbance.   Respiratory: Negative for shortness of breath.    Gastrointestinal: Negative for diarrhea and nausea.   Neurological: Positive for numbness.     Scheduled Meds:   aspirin  81 mg Oral Daily    atorvastatin  40 mg Oral Daily    carvedilol  6.25 mg Oral BID WM    heparin (porcine)  5,000 Units Subcutaneous Q8H    sodium chloride 0.9%  3 mL Intravenous Q8H    thiamine  100 mg Oral Daily     Continuous Infusions:   sodium chloride 0.9%       PRN Meds:labetalol, sodium chloride 0.9%    Objective:     Vital Signs (Most Recent):  Temp: 98 °F (36.7 °C) (01/22/17 1600)  Pulse: 81 (01/22/17 1700)  Resp: 18 (01/22/17 1600)  BP: (!) 156/95 (01/22/17 1600)  SpO2: 97 % (01/22/17 1600)  BP Location: Right arm    Vital Signs Range (Last 24H):  Temp:  [97.3 °F (36.3 °C)-98.5 °F (36.9 °C)]   Pulse:  [74-93]   Resp:  [16-18]   BP: (148-190)/()   SpO2:  [94 %-97 %]   BP Location: Right arm    Physical Exam   Constitutional: He is oriented to person, place, and time. He appears well-developed and well-nourished.   HENT:   Head: Normocephalic and atraumatic.   Eyes: EOM are normal. Pupils are equal, round, and reactive to light.   Neck: Normal range of motion.   Cardiovascular: Normal rate.    Musculoskeletal: Normal range of motion.   Neurological: He is alert and oriented to person, place, and time.   Skin: Skin is warm and dry.   Psychiatric: He has a normal mood and affect.        Neurological Exam:   LOC: alert and follows requests  Language: No aphasia  Speech: No dysarthria  Orientation: Person, Place, Time  Memory: Recent memory intact, Remote memory intact, Age correct, Month correct  Visual Fields (CN II): Full  EOM (CN III, IV, VI): Full/intact  Facial Sensation (CN V): Symmetric  Facial Movement (CN VII): lower weakness left lower  Shoulder/Neck (CN XI): SCM-Left: Normal ; SCM-Right: Normal ; Shoulder Shrug: Normal/Symetric  Motor*: Arm Left:  Normal (5/5), Leg Left:   Paretic:  4/5, Arm Right:   Normal (5/5), Leg Right:   Paretic:  4/5  Cerebellar*: Normal limb  Tone: Arm-Left: normal; Leg-Left: normal; Arm-Right: normal; Leg-Right: normal    NIH Stroke Scale:    Level of Consciousness: 0 - alert  LOC Questions: 0 - answers both correctly  LOC Commands: 0 - performs both correctly  Best Gaze: 0 - normal  Visual: 0 - no visual loss  Facial Palsy: 1 - minor  Motor Left Arm: 0 - no drift  Motor Right Arm: 0 - no drift  Motor Left Le - drift  Motor Right Le - drift  Limb Ataxia: 0 - absent  Sensory: 1 - mild to moderate loss  Best Language: 0 - no aphasia  Dysarthria: 0 - normal articulation  Extinction and Inattention: 0 - no neglect  NIH Stroke Scale Total: 4      Laboratory:  CMP:   Recent Labs  Lab 17  0610   CALCIUM 9.1   ALBUMIN 3.6   PROT 7.2      K 3.7   CO2 26      BUN 12   CREATININE 1.1   ALKPHOS 83   ALT 36   AST 48*   BILITOT 0.8     BMP:   Recent Labs  Lab 17  0610      K 3.7      CO2 26   BUN 12   CREATININE 1.1   CALCIUM 9.1     CBC:   Recent Labs  Lab 17  0610   WBC 6.56   RBC 4.38*   HGB 14.3   HCT 42.7      MCV 98   MCH 32.6*   MCHC 33.5     Lipid Panel:   Recent Labs  Lab 17  1255   CHOL 161   LDLCALC 92.4   HDL 37*   TRIG 158*     Coagulation:   Recent Labs  Lab 17  1255   INR 1.0     Platelet Aggregation Study: No results for input(s): PLTAGG, PLTAGINTERP, PLTAGREGLACO, ADPPLTAGGREG in the  last 168 hours.  Hgb A1C:   Recent Labs  Lab 01/21/17  1255   HGBA1C 5.2     TSH:   Recent Labs  Lab 01/21/17  1255   TSH 1.146       Diagnostic Results:  I have personally reviewed: MRI Head. Date: 1/22/17 and MRA Head/Neck. Date: 1/22/17  Findings:   Small size focus of signal abnormality right corona radiata extending to the basal ganglia most compatible with acute/recent infarction . No evidence for hemorrhagic conversion or significant mass effect.      Assessment/Plan:     Tonya Wayne is a 51 y.o. Male with a PMHx of HTN presenting with L facial droop and L sided weakness lasting 20 minutes. Upon arrival, patient's LLE weakness has improved, but still persisted. On exam patient had LLE drift with NIH of 1. Patient not given tPA due to no significant neurologic deficit. Will admit patient to observation for stroke workup. CT head without acute intracranial abnormalities. MRI/MRA unable to be completed with contrast due to patient's anxiety in the MRI. MRI with acute infarct R corona radiata extending to R BG. MRA head without contrast unremarkable. MRA neck with contrast unable to be completed. Ordered carotid US as alternative. ECHO pending.         * Ischemic stroke  Tonya Wayne is a 51 y.o. male with a PMHx of HTN with LLE weakness. CT without abnormalities. MRI and MRA with R corona radiata/BG infarct & unremarkable MRA brain. Unable to tolerate whole MRA to get MRA neck with contrast. Pending ECHO.    Antithrombotics for secondary stroke prevention: Antiplatelets:  Aspirin: 81 mg oral now and daily  Statins for secondary stroke prevention and hyperlipidemia, if present: Atorvastatin- 40 mg oral daily  Aggressive risk factor modification: Hypertension  Rehab Efforts: Physical Therapy, Occupational Therapy and Speech and Language Pathology  Diagnostics: Ordered/Pending Other: Echo, ordered Carotid US as alternative to MRA neck   VTE Prophylaxis: Heparin 5000 units SQ every 8 hours  BP Parameters: SBP  <200  Nursing Orders: Neuro checks- Q4H, Swallowing evaluation by Nursing, Out of bed BID, Avoid Neal catheter, Pneumatic compression device, Stroke Education, Blood glucose target 100-130, Up with assistance  IV Fluids: NS @ 75cc/hr      Benign essential hypertension  Stroke risk factor  On lisinopril and coreg at home  Started back on home carvedilol   SBP <200  labetolol prn      Mallory Solares PA-C  Comprehensive Stroke Center  Department of Vascular Neurology   [facility]

## 2017-01-23 NOTE — PLAN OF CARE
Problem: Patient Care Overview  Goal: Plan of Care Review  Pt with no falls or injuries this shift. Cardiac monitoring HR 70s-90s.NSR. Continues with slight numbness to left foot.

## 2017-01-23 NOTE — SUBJECTIVE & OBJECTIVE
Neurologic Chief Complaint: L facial droop and left sided weakness    Subjective:     Interval History: Patient is seen for follow-up neurological assessment and treatment recommendations: MAURICIO. Able to obtain majority of MRI this morning.     HPI, Past Medical, Family, and Social History remains the same as documented in the initial encounter.     Review of Systems   Constitutional: Negative for fever.   Eyes: Negative for visual disturbance.   Respiratory: Negative for shortness of breath.    Gastrointestinal: Negative for diarrhea and nausea.   Neurological: Positive for numbness.     Scheduled Meds:   aspirin  81 mg Oral Daily    atorvastatin  40 mg Oral Daily    carvedilol  6.25 mg Oral BID WM    heparin (porcine)  5,000 Units Subcutaneous Q8H    sodium chloride 0.9%  3 mL Intravenous Q8H    thiamine  100 mg Oral Daily     Continuous Infusions:   sodium chloride 0.9%       PRN Meds:labetalol, sodium chloride 0.9%    Objective:     Vital Signs (Most Recent):  Temp: 98 °F (36.7 °C) (01/22/17 1600)  Pulse: 81 (01/22/17 1700)  Resp: 18 (01/22/17 1600)  BP: (!) 156/95 (01/22/17 1600)  SpO2: 97 % (01/22/17 1600)  BP Location: Right arm    Vital Signs Range (Last 24H):  Temp:  [97.3 °F (36.3 °C)-98.5 °F (36.9 °C)]   Pulse:  [74-93]   Resp:  [16-18]   BP: (148-190)/()   SpO2:  [94 %-97 %]   BP Location: Right arm    Physical Exam   Constitutional: He is oriented to person, place, and time. He appears well-developed and well-nourished.   HENT:   Head: Normocephalic and atraumatic.   Eyes: EOM are normal. Pupils are equal, round, and reactive to light.   Neck: Normal range of motion.   Cardiovascular: Normal rate.    Musculoskeletal: Normal range of motion.   Neurological: He is alert and oriented to person, place, and time.   Skin: Skin is warm and dry.   Psychiatric: He has a normal mood and affect.       Neurological Exam:   LOC: alert and follows requests  Language: No aphasia  Speech: No  dysarthria  Orientation: Person, Place, Time  Memory: Recent memory intact, Remote memory intact, Age correct, Month correct  Visual Fields (CN II): Full  EOM (CN III, IV, VI): Full/intact  Facial Sensation (CN V): Symmetric  Facial Movement (CN VII): lower weakness left lower  Shoulder/Neck (CN XI): SCM-Left: Normal ; SCM-Right: Normal ; Shoulder Shrug: Normal/Symetric  Motor*: Arm Left:  Normal (5/5), Leg Left:   Paretic:  4/5, Arm Right:   Normal (5/5), Leg Right:   Paretic:  4/5  Cerebellar*: Normal limb  Tone: Arm-Left: normal; Leg-Left: normal; Arm-Right: normal; Leg-Right: normal    NIH Stroke Scale:    Level of Consciousness: 0 - alert  LOC Questions: 0 - answers both correctly  LOC Commands: 0 - performs both correctly  Best Gaze: 0 - normal  Visual: 0 - no visual loss  Facial Palsy: 1 - minor  Motor Left Arm: 0 - no drift  Motor Right Arm: 0 - no drift  Motor Left Le - drift  Motor Right Le - drift  Limb Ataxia: 0 - absent  Sensory: 1 - mild to moderate loss  Best Language: 0 - no aphasia  Dysarthria: 0 - normal articulation  Extinction and Inattention: 0 - no neglect  NIH Stroke Scale Total: 4      Laboratory:  CMP:   Recent Labs  Lab 17  0610   CALCIUM 9.1   ALBUMIN 3.6   PROT 7.2      K 3.7   CO2 26      BUN 12   CREATININE 1.1   ALKPHOS 83   ALT 36   AST 48*   BILITOT 0.8     BMP:   Recent Labs  Lab 17  0610      K 3.7      CO2 26   BUN 12   CREATININE 1.1   CALCIUM 9.1     CBC:   Recent Labs  Lab 17  0610   WBC 6.56   RBC 4.38*   HGB 14.3   HCT 42.7      MCV 98   MCH 32.6*   MCHC 33.5     Lipid Panel:   Recent Labs  Lab 17  1255   CHOL 161   LDLCALC 92.4   HDL 37*   TRIG 158*     Coagulation:   Recent Labs  Lab 17  1255   INR 1.0     Platelet Aggregation Study: No results for input(s): PLTAGG, PLTAGINTERP, PLTAGREGLACO, ADPPLTAGGREG in the last 168 hours.  Hgb A1C:   Recent Labs  Lab 17  1255   HGBA1C 5.2     TSH:   Recent  Labs  Lab 01/21/17  1255   TSH 1.146       Diagnostic Results:  I have personally reviewed: MRI Head. Date: 1/22/17 and MRA Head/Neck. Date: 1/22/17  Findings:   Small size focus of signal abnormality right corona radiata extending to the basal ganglia most compatible with acute/recent infarction . No evidence for hemorrhagic conversion or significant mass effect.

## 2017-01-23 NOTE — ASSESSMENT & PLAN NOTE
Stroke risk factor  On lisinopril and coreg at home  Started back on home carvedilol   SBP <200  labetolol prn

## 2017-01-23 NOTE — RESEARCH
Patient was invited to participate in the [facility] Research Registry.  The purpose, procedures, risks, benefits, and alternatives were explained to the patient and all the questions were answered to the patient's satisfaction.  Freely given informed consent was obtained and a copy of the signed document has been filed in the patient's medical record.  No study activities or assessments were done prior to obtaining consent from the participant and signature of the informed consent form by both the study participant and a member of the study team.      Jenny Guzmán, NP-C  Vascular Neurology  [phone]

## 2017-01-23 NOTE — PLAN OF CARE
Problem: Patient Care Overview  Goal: Plan of Care Review  Outcome: Ongoing (interventions implemented as appropriate)  Patient AAOX4. Safety maintained. Bed locked in low with 2 side rails up. Call light within reach. No events noted on tele.

## 2017-01-23 NOTE — CONSULTS
PM&R consult received.  Reviewed patient history and current admission.     Evaluated by therapy.  Patient is independent-mod I with mobility, transfers, functional ambulation, and ADLs.  Patient is close to premorbid level of function and without goals for inpatient rehab.  Will sign off.  Please call with questions/concerns or re-consult if situation changes.    IAN Barton, ALEKSANDARP-C  Physical Medicine & Rehabilitation   01/23/2017  Spectralink: [id number]

## 2017-01-24 ENCOUNTER — TELEPHONE (OUTPATIENT)
Dept: NEUROSURGERY | Facility: HOSPITAL | Age: 52
End: 2017-01-24

## 2017-01-24 NOTE — SUBJECTIVE & OBJECTIVE
Neurologic Chief Complaint: ischemic stroke in [address] radiate to basal ganglia     Subjective:     Interval History: Patient is seen for follow-up neurological assessment and treatment recommendations: NAEON. No active complain.     HPI, Past Medical, Family, and Social History remains the same as documented in the initial encounter.     Review of Systems  Scheduled Meds:  Continuous Infusions:  PRN Meds:    Objective:     Vital Signs (Most Recent):  Temp: 98.1 °F (36.7 °C) (01/23/17 1200)  Pulse: 75 (01/23/17 1200)  Resp: 18 (01/23/17 1200)  BP: (!) 162/93 (01/23/17 1200)  SpO2: 97 % (01/23/17 1200)  BP Location: Right arm    Vital Signs Range (Last 24H):  Temp:  [98 °F (36.7 °C)-98.4 °F (36.9 °C)]   Pulse:  [70-94]   Resp:  [18]   BP: (139-185)/()   SpO2:  [96 %-99 %]   BP Location: Right arm    Physical Exam   Constitutional: He is oriented to person, place, and time. He appears well-developed and well-nourished.   HENT:   Head: Normocephalic and atraumatic.   Eyes: EOM are normal. Pupils are equal, round, and reactive to light.   Neck: Normal range of motion.   Cardiovascular: Normal rate.    Musculoskeletal: Normal range of motion.   Neurological: He is alert and oriented to person, place, and time.   Skin: Skin is warm and dry.   Psychiatric: He has a normal mood and affect.       Neurological Exam:   LOC: alert and follows requests  Language: No aphasia  Speech: No dysarthria  Orientation: Person, Place, Time  Memory: Recent memory intact, Remote memory intact, Age correct, Month correct  Visual Fields (CN II): Full  EOM (CN III, IV, VI): Full/intact  Pupils (CN III, IV, VI): PERRL  Facial Sensation (CN V): Symmetric  Facial Movement (CN VII): symmetric facial expression  Hearing (CN VIII): intact bilaterally  Motor*: Arm Left: Normal (5/5), Leg Left: Paretic: 4/5, Arm Right: Normal (5/5), Leg Right: Normal (5/5)  Cerebellar*: Normal limb, Normal gait , Normal stance  Sensation: intact to light  touch, temperature and vibration  Tone: Arm-Left: normal; Leg-Left: normal; Arm-Right: normal; Leg-Right: normal    NIH Stroke Scale:    Level of Consciousness: 0 - alert  LOC Questions: 0 - answers both correctly  LOC Commands: 0 - performs both correctly  Best Gaze: 0 - normal  Visual: 0 - no visual loss  Facial Palsy: 1 - minor  Motor Left Arm: 0 - no drift  Motor Right Arm: 0 - no drift  Motor Left Le - drift  Motor Right Le - drift  Limb Ataxia: 0 - absent  Sensory: 1 - mild to moderate loss  Best Language: 0 - no aphasia  Dysarthria: 0 - normal articulation  Extinction and Inattention: 0 - no neglect  NIH Stroke Scale Total: 4        Laboratory:  CMP:   Recent Labs  Lab 17  042   CALCIUM 9.1   ALBUMIN 3.3*   PROT 6.9      K 4.0   CO2 27      BUN 15   CREATININE 1.2   ALKPHOS 96   ALT 33   AST 33   BILITOT 0.5     BMP:   Recent Labs  Lab 17  0427      K 4.0      CO2 27   BUN 15   CREATININE 1.2   CALCIUM 9.1     CBC:   Recent Labs  Lab 17  0427   WBC 5.33   RBC 4.20*   HGB 13.9*   HCT 41.5      MCV 99*   MCH 33.1*   MCHC 33.5     Lipid Panel:   Recent Labs  Lab 17  1255   CHOL 161   LDLCALC 92.4   HDL 37*   TRIG 158*     Platelet Aggregation Study: No results for input(s): PLTAGG, PLTAGINTERP, PLTAGREGLACO, ADPPLTAGGREG in the last 168 hours.  Hgb A1C:   Recent Labs  Lab 17  1255   HGBA1C 5.2     TSH:   Recent Labs  Lab 17  1255   TSH 1.146

## 2017-01-24 NOTE — TELEPHONE ENCOUNTER
"Attempted to contact patient via number on file.  No answer.  The following message was left for patient to return call "Hello.  This is a message for Mr. Patelvis Tone.  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  If you could give me call back at [phone] between the hours of 08:00 AM and 04:00 PM, Monday through Friday.  Thanks so much and have a great day."  Will try again later.   "

## 2017-01-24 NOTE — PROGRESS NOTES
[facility]  Vascular Neurology  Comprehensive Stroke Center  Progress Note      Neurologic Chief Complaint: ischemic stroke in [address] radiate to basal ganglia     Subjective:     Interval History: Patient is seen for follow-up neurological assessment and treatment recommendations: NAEON. No active complain.     HPI, Past Medical, Family, and Social History remains the same as documented in the initial encounter.     Review of Systems  Scheduled Meds:  Continuous Infusions:  PRN Meds:    Objective:     Vital Signs (Most Recent):  Temp: 98.1 °F (36.7 °C) (01/23/17 1200)  Pulse: 75 (01/23/17 1200)  Resp: 18 (01/23/17 1200)  BP: (!) 162/93 (01/23/17 1200)  SpO2: 97 % (01/23/17 1200)  BP Location: Right arm    Vital Signs Range (Last 24H):  Temp:  [98 °F (36.7 °C)-98.4 °F (36.9 °C)]   Pulse:  [70-94]   Resp:  [18]   BP: (139-185)/()   SpO2:  [96 %-99 %]   BP Location: Right arm    Physical Exam   Constitutional: He is oriented to person, place, and time. He appears well-developed and well-nourished.   HENT:   Head: Normocephalic and atraumatic.   Eyes: EOM are normal. Pupils are equal, round, and reactive to light.   Neck: Normal range of motion.   Cardiovascular: Normal rate.    Musculoskeletal: Normal range of motion.   Neurological: He is alert and oriented to person, place, and time.   Skin: Skin is warm and dry.   Psychiatric: He has a normal mood and affect.       Neurological Exam:   LOC: alert and follows requests  Language: No aphasia  Speech: No dysarthria  Orientation: Person, Place, Time  Memory: Recent memory intact, Remote memory intact, Age correct, Month correct  Visual Fields (CN II): Full  EOM (CN III, IV, VI): Full/intact  Pupils (CN III, IV, VI): PERRL  Facial Sensation (CN V): Symmetric  Facial Movement (CN VII): symmetric facial expression  Hearing (CN VIII): intact bilaterally  Motor*: Arm Left: Normal (5/5), Leg Left: Paretic: 4/5, Arm Right: Normal (5/5), Leg Right:  Normal (5/5)  Cerebellar*: Normal limb, Normal gait , Normal stance  Sensation: intact to light touch, temperature and vibration  Tone: Arm-Left: normal; Leg-Left: normal; Arm-Right: normal; Leg-Right: normal    NIH Stroke Scale:    Level of Consciousness: 0 - alert  LOC Questions: 0 - answers both correctly  LOC Commands: 0 - performs both correctly  Best Gaze: 0 - normal  Visual: 0 - no visual loss  Facial Palsy: 1 - minor  Motor Left Arm: 0 - no drift  Motor Right Arm: 0 - no drift  Motor Left Le - drift  Motor Right Le - no drift  Limb Ataxia: 0 - absent  Sensory: 1 - mild to moderate loss  Best Language: 0 - no aphasia  Dysarthria: 0 - normal articulation  Extinction and Inattention: 0 - no neglect  NIH Stroke Scale Total: 3    TOAST classification: Small vessel occlusion     Laboratory:  CMP:   Recent Labs  Lab 17  0427   CALCIUM 9.1   ALBUMIN 3.3*   PROT 6.9      K 4.0   CO2 27      BUN 15   CREATININE 1.2   ALKPHOS 96   ALT 33   AST 33   BILITOT 0.5     BMP:   Recent Labs  Lab 17  0427      K 4.0      CO2 27   BUN 15   CREATININE 1.2   CALCIUM 9.1     CBC:   Recent Labs  Lab 17  0427   WBC 5.33   RBC 4.20*   HGB 13.9*   HCT 41.5      MCV 99*   MCH 33.1*   MCHC 33.5     Lipid Panel:   Recent Labs  Lab 17  1255   CHOL 161   LDLCALC 92.4   HDL 37*   TRIG 158*     Platelet Aggregation Study: No results for input(s): PLTAGG, PLTAGINTERP, PLTAGREGLACO, ADPPLTAGGREG in the last 168 hours.  Hgb A1C:   Recent Labs  Lab 17  1255   HGBA1C 5.2     TSH:   Recent Labs  Lab 17  1255   TSH 1.146         Assessment/Plan:     Tonya Wayne is a 51 y.o. Male with a PMHx of HTN presenting with L facial droop and L sided weakness lasting 20 minutes. Upon arrival, patient's LLE weakness has improved, but still persisted. On exam patient had LLE drift with NIH of 1. Patient not given tPA due to no significant neurologic deficit. Admitted to observation for  stroke workup. CT head without acute intracranial abnormalities. MRI/MRA unable to be completed with contrast due to patient's anxiety in the MRI. MRI with acute infarct R corona radiata extending to R BG. MRA head without contrast unremarkable. MRA neck with contrast unable to be completed. carotid US non-significant stenosis bilaterally. ECHO normal.         * Ischemic stroke  Tonya Wayne is a 51 y.o. male with a PMHx of HTN with LLE weakness. CT without abnormalities. MRI and MRA with R corona radiata/BG infarct & unremarkable MRA brain. Unable to tolerate whole MRA to get MRA neck with contrast. ECHO done.    Antithrombotics for secondary stroke prevention: Antiplatelets:  Aspirin: 81 mg oral now and daily  Statins for secondary stroke prevention and hyperlipidemia, if present: Atorvastatin- 40 mg oral daily  Aggressive risk factor modification: Hypertension  Rehab Efforts: Physical Therapy, Occupational Therapy and Speech and Language Pathology  Diagnostics: Ordered/Pending Other: Echo, ordered Carotid US as alternative to MRA neck   VTE Prophylaxis: Heparin 5000 units SQ every 8 hours  BP Parameters: SBP <200  Nursing Orders: Neuro checks- Q4H, Swallowing evaluation by Nursing, Out of bed BID, Avoid Neal catheter, Pneumatic compression device, Stroke Education, Blood glucose target 100-130, Up with assistance  IV Fluids: NS @ 75cc/hr      Benign essential hypertension  Stroke risk factor  On lisinopril and coreg at home  Resume home meds  SBP <200  labetolol prn    Bilateral carotid artery stenosis  U/S carotid showing < 39% stenosis  No intervention at this time   Echo within normal limit       Shlomo King MD  Comprehensive Stroke Center  Department of Vascular Neurology   [facility]    Vascular Neurology Attending  Agree as documented above.  Improving R small vessel stroke. D/C today.  Eddie Rodriguez MD

## 2017-01-24 NOTE — ASSESSMENT & PLAN NOTE
U/S carotid showing < 39% stenosis  No intervention at this time   Echo within normal limit

## 2017-01-24 NOTE — ASSESSMENT & PLAN NOTE
Tonya Wayne is a 51 y.o. male with a PMHx of HTN with LLE weakness. CT without abnormalities. MRI and MRA with R corona radiata/BG infarct & unremarkable MRA brain. Unable to tolerate whole MRA to get MRA neck with contrast. ECHO within normal limit.    Antithrombotics for secondary stroke prevention: Antiplatelets:  Aspirin: 81 mg oral now and daily  Statins for secondary stroke prevention and hyperlipidemia, if present: Atorvastatin- 40 mg oral daily  Aggressive risk factor modification: Hypertension  Rehab Efforts: Physical Therapy, Occupational Therapy and Speech and Language Pathology  Diagnostics: Ordered/Pending Other: Echo, ordered Carotid US as alternative to MRA neck   VTE Prophylaxis: Heparin 5000 units SQ every 8 hours  BP Parameters: SBP <200  Nursing Orders: Neuro checks- Q4H, Swallowing evaluation by Nursing, Out of bed BID, Avoid Neal catheter, Pneumatic compression device, Stroke Education, Blood glucose target 100-130, Up with assistance  IV Fluids: NS @ 75cc/hr

## 2017-01-24 NOTE — SUBJECTIVE & OBJECTIVE
NIH Stroke Scale:    Level of Consciousness: 0 - alert  LOC Questions: 0 - answers both correctly  LOC Commands: 0 - performs both correctly  Best Gaze: 0 - normal  Visual: 0 - no visual loss  Facial Palsy: 1 - minor  Motor Left Arm: 0 - no drift  Motor Right Arm: 0 - no drift  Motor Left Le - drift  Motor Right Le - drift  Limb Ataxia: 0 - absent  Sensory: 1 - mild to moderate loss  Best Language: 0 - no aphasia  Dysarthria: 0 - normal articulation  Extinction and Inattention: 0 - no neglect  NIH Stroke Scale Total: 4  Modified [place] Scale:   Timeline: Prior to symptoms onset  Modified [place] Score: 1 - no significant disability

## 2017-01-25 ENCOUNTER — TELEPHONE (OUTPATIENT)
Dept: NEUROSURGERY | Facility: HOSPITAL | Age: 52
End: 2017-01-25

## 2017-01-25 NOTE — TELEPHONE ENCOUNTER
"Attempted to contact patient via number on file.  No answer.  The following message was left for patient to return call "Hello.  This is a message for Mr. Patelvis Tone.  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  If you could give me call back at [phone] between the hours of 08:00 AM and 04:00 PM, Monday through Friday.  Thanks so much and have a great day."  Will try again later.   "

## 2017-01-27 ENCOUNTER — TELEPHONE (OUTPATIENT)
Dept: NEUROSURGERY | Facility: HOSPITAL | Age: 52
End: 2017-01-27

## 2017-01-27 NOTE — ASSESSMENT & PLAN NOTE
Stroke risk factor  On lisinopril and coreg at home  D/C home meds for now  SBP <220  labetolol prn   Patient is a 81 year old female with a PMHX of asthma, COPD, Afib (on pradaxa) and hypothyroidism who presents with a COPD exacerbation

## 2017-01-27 NOTE — TELEPHONE ENCOUNTER
"Attempted to contact patient via number on file.  No answer.  The following message was left for patient to return call "Hello.  This is a message for  Tonya Tone.  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  If you could give me call back at [phone] between the hours of 08:00 AM and 04:00 PM, Monday through Friday.  Thanks so much and have a great day."  Third unsuccessful attempt.   "

## 2017-02-14 ENCOUNTER — HOME CARE VISIT (OUTPATIENT)
Dept: NEUROLOGY | Facility: HOSPITAL | Age: 52
End: 2017-02-14

## 2017-05-25 VITALS
RESPIRATION RATE: 20 BRPM | HEART RATE: 84 BPM | SYSTOLIC BLOOD PRESSURE: 154 MMHG | OXYGEN SATURATION: 98 % | DIASTOLIC BLOOD PRESSURE: 106 MMHG

## 2017-05-25 NOTE — PROGRESS NOTES
"AAOx3. Denies discomfort.  Lives w/ family but visit occurred alone. Elevated blood pressure. Reports not taking blood pressure medication "all the time".  Stressed the importance of compliance w/ medications for optimal health.  Education provided on goal of stroke mobile, s/s of stroke, diet, exercise, medications.  Client verbalizes understanding.  Encouraged to utilize stroke team for any concern.  "

## 2017-11-04 ENCOUNTER — HOSPITAL ENCOUNTER (EMERGENCY)
Facility: OTHER | Age: 52
Discharge: HOME | End: 2017-11-04
Attending: EMERGENCY MEDICINE
Payer: MEDICAID

## 2017-11-04 VITALS
HEART RATE: 84 BPM | TEMPERATURE: 97 F | HEIGHT: 74 IN | OXYGEN SATURATION: 95 % | SYSTOLIC BLOOD PRESSURE: 139 MMHG | DIASTOLIC BLOOD PRESSURE: 93 MMHG | RESPIRATION RATE: 13 BRPM | WEIGHT: 240 LBS | BODY MASS INDEX: 30.8 KG/M2

## 2017-11-04 DIAGNOSIS — R42 DIZZINESS: ICD-10-CM

## 2017-11-04 DIAGNOSIS — K76.0 STEATOSIS OF LIVER: ICD-10-CM

## 2017-11-04 DIAGNOSIS — E86.0 DEHYDRATION: ICD-10-CM

## 2017-11-04 DIAGNOSIS — R55 NEAR SYNCOPE: Primary | ICD-10-CM

## 2017-11-04 LAB
ALBUMIN SERPL BCP-MCNC: 3.2 G/DL
ALP SERPL-CCNC: 196 U/L
ALT SERPL W/O P-5'-P-CCNC: 70 U/L
ANION GAP SERPL CALC-SCNC: 10 MMOL/L
APTT BLDCRRT: 29.8 SEC
AST SERPL-CCNC: 135 U/L
BASOPHILS # BLD AUTO: 0.01 K/UL
BASOPHILS NFR BLD: 0.2 %
BILIRUB SERPL-MCNC: 1.8 MG/DL
BUN SERPL-MCNC: 8 MG/DL
CALCIUM SERPL-MCNC: 9.1 MG/DL
CHLORIDE SERPL-SCNC: 103 MMOL/L
CO2 SERPL-SCNC: 26 MMOL/L
CREAT SERPL-MCNC: 1.4 MG/DL
DIFFERENTIAL METHOD: ABNORMAL
EOSINOPHIL # BLD AUTO: 0 K/UL
EOSINOPHIL NFR BLD: 0.4 %
ERYTHROCYTE [DISTWIDTH] IN BLOOD BY AUTOMATED COUNT: 13.6 %
EST. GFR  (AFRICAN AMERICAN): >60 ML/MIN/1.73 M^2
EST. GFR  (NON AFRICAN AMERICAN): 58 ML/MIN/1.73 M^2
ETHANOL SERPL-MCNC: 11 MG/DL
GLUCOSE SERPL-MCNC: 121 MG/DL
HCT VFR BLD AUTO: 37.2 %
HGB BLD-MCNC: 12.4 G/DL
INR PPP: 1
LYMPHOCYTES # BLD AUTO: 1.6 K/UL
LYMPHOCYTES NFR BLD: 29.5 %
MAGNESIUM SERPL-MCNC: 2.1 MG/DL
MCH RBC QN AUTO: 34.3 PG
MCHC RBC AUTO-ENTMCNC: 33.3 G/DL
MCV RBC AUTO: 103 FL
MONOCYTES # BLD AUTO: 0.6 K/UL
MONOCYTES NFR BLD: 11.2 %
NEUTROPHILS # BLD AUTO: 3.2 K/UL
NEUTROPHILS NFR BLD: 58.3 %
PLATELET # BLD AUTO: 147 K/UL
PMV BLD AUTO: 11.1 FL
POCT GLUCOSE: 137 MG/DL (ref 70–110)
POTASSIUM SERPL-SCNC: 3.7 MMOL/L
PROT SERPL-MCNC: 7.3 G/DL
PROTHROMBIN TIME: 11.2 SEC
RBC # BLD AUTO: 3.61 M/UL
SODIUM SERPL-SCNC: 139 MMOL/L
WBC # BLD AUTO: 5.45 K/UL

## 2017-11-04 PROCEDURE — 85730 THROMBOPLASTIN TIME PARTIAL: CPT

## 2017-11-04 PROCEDURE — 83735 ASSAY OF MAGNESIUM: CPT

## 2017-11-04 PROCEDURE — 82962 GLUCOSE BLOOD TEST: CPT

## 2017-11-04 PROCEDURE — 93010 ELECTROCARDIOGRAM REPORT: CPT | Mod: ,,, | Performed by: INTERNAL MEDICINE

## 2017-11-04 PROCEDURE — 80053 COMPREHEN METABOLIC PANEL: CPT

## 2017-11-04 PROCEDURE — 96360 HYDRATION IV INFUSION INIT: CPT

## 2017-11-04 PROCEDURE — 80074 ACUTE HEPATITIS PANEL: CPT

## 2017-11-04 PROCEDURE — 80320 DRUG SCREEN QUANTALCOHOLS: CPT

## 2017-11-04 PROCEDURE — 96361 HYDRATE IV INFUSION ADD-ON: CPT

## 2017-11-04 PROCEDURE — 99284 EMERGENCY DEPT VISIT MOD MDM: CPT | Mod: 25

## 2017-11-04 PROCEDURE — 85610 PROTHROMBIN TIME: CPT

## 2017-11-04 PROCEDURE — 25000003 PHARM REV CODE 250: Performed by: EMERGENCY MEDICINE

## 2017-11-04 PROCEDURE — 85025 COMPLETE CBC W/AUTO DIFF WBC: CPT

## 2017-11-04 RX ADMIN — SODIUM CHLORIDE 1000 ML: 0.9 INJECTION, SOLUTION INTRAVENOUS at 12:11

## 2017-11-04 RX ADMIN — SODIUM CHLORIDE 1000 ML: 0.9 INJECTION, SOLUTION INTRAVENOUS at 02:11

## 2017-11-04 NOTE — DISCHARGE INSTRUCTIONS
Your blood tests and ultrasound show that your liver has developed fat and inflammation around it - this is probably due to years of drinking alcohol. Your doctor needs to know about this and monitor it closely. You should stop taking your Lipitor (Atorvastatin) until you see your doctor - sometimes this can make your liver worse.

## 2017-11-04 NOTE — ED NOTES
"Pt presents to ED with c/o blurred vision, stating '"it feel likes a curtain has been over my eyes" x 1 week. Pt also reporting numbness and tingling in his bilateral toes x 1 month. Pt with no facial asymmetry, aphasia, or weakness in extremities. Pt AAOx4 and appropriate at this time. Respirations even and unlabored. No acute distress noted. Awaiting further orders. Pt updated on POC. Bed is locked and in lowest position with side rails up x2. Call bell within reach and pt oriented to use of call bell. Pt on continuous cardiac monitoring, continuous pulse ox, and continuous BP cuff. Will continue to monitor.     "

## 2017-11-04 NOTE — ED PROVIDER NOTES
"Encounter Date: 11/4/2017    SCRIBE #1 NOTE: I, Shila Peralta, am scribing for, and in the presence of, Dr. Ramirez.       History     Chief Complaint   Patient presents with    Dizziness     starting at 1030 today, sudden onset of dizziness w/ blurred vision     Time seen by provider: 12:10 PM    This is a 52 y.o. male, with history of CVA and HTN , who presents with complaint of visual disturbance which started approximately two hours ago. Onset occurred while the patient was walking; he states that "things started getting dim." Bilateral visual disturbance is described as intermittent, and patient denies having previously experienced symptoms. He states that "it's like there's a glare in the room," and that dimness makes him feel like he is going to pass out.  Patient denies syncope, new weakness, blurred vision, double vision, headache, nausea, vomiting, or diarrhea. Dizziness is exacerbated with standing up.     The patient states that with previous CVA he felt he was "drifting off asleep" when he noticed left sided weakness.  He still has residual weakness. The patient states that he is supposed to be on blood thinners, but hasn't taken them in a while. The patient reports chronic dizziness and cramping in the bilateral hands and feet. Review of medical records shows that he used to abuse alcohol, he states that he now drink 3-4 times/week. He also notes that he does not consume very much food during the day, but that he does drink water.     Additional past medical, surgical, and social history as outlined in the nursing assessment was reviewed by me.      The history is provided by the patient.     Review of patient's allergies indicates:   Allergen Reactions    Iodine and iodide containing products Hives     Past Medical History:   Diagnosis Date    Hypertension     Ischemic stroke     Kidney stone      Past Surgical History:   Procedure Laterality Date    ANKLE FRACTURE SURGERY Left     kidney stone " removal       History reviewed. No pertinent family history.  Social History   Substance Use Topics    Smoking status: Never Smoker    Smokeless tobacco: Never Used    Alcohol use 2.4 oz/week     4 Cans of beer per week      Comment: occasionally     Review of Systems   Constitutional: Negative for fever.   HENT: Negative for sore throat, trouble swallowing and voice change.    Eyes: Positive for visual disturbance.   Respiratory: Negative for shortness of breath.    Cardiovascular: Negative for chest pain.   Gastrointestinal: Negative for nausea.   Genitourinary: Negative for decreased urine volume and dysuria.   Musculoskeletal: Negative for back pain.        Positive for cramps in bilateral hands and feet.   Skin: Negative for rash.   Allergic/Immunologic: Negative for immunocompromised state.   Neurological: Positive for dizziness and weakness (chronic).   Hematological: Does not bruise/bleed easily.   Psychiatric/Behavioral: Negative for confusion.       Physical Exam     Initial Vitals [11/04/17 1143]   BP Pulse Resp Temp SpO2   109/74 81 16 97.4 °F (36.3 °C) 97 %      MAP       85.67         Physical Exam    Nursing note and vitals reviewed.  Constitutional: He appears well-developed and well-nourished. He is not diaphoretic. No distress.   HENT:   Head: Normocephalic and atraumatic.   Right Ear: External ear normal.   Left Ear: External ear normal.   Eyes: Conjunctivae and EOM are normal. Pupils are equal, round, and reactive to light. Right eye exhibits no discharge. Left eye exhibits no discharge.   Visual fields intact. Visual acuity 20/70 bilaterally.   Neck: Normal range of motion. Neck supple. No tracheal deviation present.   Cardiovascular: Normal rate, regular rhythm, normal heart sounds and intact distal pulses. Exam reveals no gallop and no friction rub.    No murmur heard.  Pulmonary/Chest: Breath sounds normal. No stridor. No respiratory distress. He has no wheezes. He has no rhonchi. He has  no rales.   Abdominal: Soft. Bowel sounds are normal. He exhibits no distension. There is no tenderness. There is no rebound and no guarding.   Musculoskeletal: Normal range of motion. He exhibits no edema or tenderness.   Neurological: He is alert and oriented to person, place, and time. He has normal strength. No cranial nerve deficit or sensory deficit.   Normal gait. Normal strength in all four extremities. Negative pronator drift.   Skin: Skin is warm and dry. Capillary refill takes less than 2 seconds. No erythema. No pallor.   Psychiatric: He has a normal mood and affect. Thought content normal.         ED Course   Critical Care  Date/Time: 11/4/2017 12:15 PM  Performed by: KEITH ARNOLD  Authorized by: KEITH ARNOLD   Direct patient critical care time: 15 minutes  Additional history critical care time: 5 minutes  Ordering / reviewing critical care time: 10 minutes  Documentation critical care time: 10 minutes  Total critical care time (exclusive of procedural time) : 40 minutes  Critical care was necessary to treat or prevent imminent or life-threatening deterioration of the following conditions: CNS failure or compromise.  Critical care was time spent personally by me on the following activities: development of treatment plan with patient or surrogate, discussions with consultants, interpretation of cardiac output measurements, evaluation of patient's response to treatment, examination of patient, obtaining history from patient or surrogate, ordering and performing treatments and interventions, ordering and review of laboratory studies, ordering and review of radiographic studies, pulse oximetry and re-evaluation of patient's condition.        Labs Reviewed   CBC W/ AUTO DIFFERENTIAL - Abnormal; Notable for the following:        Result Value    RBC 3.61 (*)     Hemoglobin 12.4 (*)     Hematocrit 37.2 (*)      (*)     MCH 34.3 (*)     Platelets 147 (*)     All other components within normal  limits   COMPREHENSIVE METABOLIC PANEL - Abnormal; Notable for the following:     Glucose 121 (*)     Albumin 3.2 (*)     Total Bilirubin 1.8 (*)     Alkaline Phosphatase 196 (*)      (*)     ALT 70 (*)     eGFR if non  58 (*)     All other components within normal limits   ALCOHOL,MEDICAL (ETHANOL) - Abnormal; Notable for the following:     Alcohol, Medical, Serum 11 (*)     All other components within normal limits   POCT GLUCOSE - Abnormal; Notable for the following:     POCT Glucose 137 (*)     All other components within normal limits   APTT   PROTIME-INR   MAGNESIUM   MAGNESIUM   HEPATITIS PANEL, ACUTE             Medical Decision Making:   Initial Assessment:   Patient presents with dizziness just prior to arrival. His history is most consistent with near syncope. He has no focal neurologic deficits on exam. I will check his orthostatics, hemoglobin, electrolytes and renal function. Review of his medical records shows a history of alcohol abuse. I will check EtOH level today. I will give IVF. I will reassess.  Independently Interpreted Test(s):   I have ordered and independently interpreted X-rays - see prior notes.  I have ordered and independently interpreted EKG Reading(s) - see prior notes  Clinical Tests:   Lab Tests: Reviewed and Ordered  Radiological Study: Reviewed and Ordered  Medical Tests: Ordered and Reviewed  ED Management:  3:06 PM - Patient's workup reveals orthostatic tachycardia. CT reveals old infarct c/w the history provided by the patient but shows no acute injury. I see no indication for MRI. Labs showed elevated LFTs which I suspect are due to chronic alcohol. Patient has no jaundice or further signs of acute hepatitis; nonetheless, I have added on a hepatitis panel and will obtain an US of the abdomen at this time.  I will continue to hydrate with IVF. I will reassess.     16:00 - Patient feeling much better and wants to go home. US reveals hepatic steatosis. I  suspect the elevated LFTs are due to alcoholic steatohepatitis. I have discussed with patient the diagnostic results, diagnosis, treatment plan, and need for follow-up. Patient has expressed understanding of my instructions. I am comfortable wit his discharge home at this time.      .    Imaging Results          US Abdomen Limited (Final result)  Result time 11/04/17 15:58:41    Final result by Hi Sal MD (11/04/17 15:58:41)                 Impression:      1.  No sonographic findings to suggest acute cholecystitis.    2.  Diffuse hepatic steatosis, correlation with LFTs advised.      Electronically signed by: HI SAL MD  Date:     11/04/17  Time:    15:58              Narrative:    Ultrasound abdomen limited    Clinical history: Elevated LFTs    Comparison: None    Technique:  Real-time sonography was performed of the abdomen.    Findings:  The visualized portions of the pancreas are grossly unremarkable.  The aorta and IVC are grossly unremarkable.  The gallbladder is decompressed, limiting evaluation.  No significant wall thickening.  Sonographic French's sign is negative.  No pericholecystic fluid.  The common duct is not dilated measuring 0.3 cm.  The liver is not enlarged however there is diffuse echogenic echotexture suggesting steatosis.  No ascites.  The right kidney is grossly unremarkable.                             CT Head Without Contrast (Final result)  Result time 11/04/17 12:08:48    Final result by Zac Nayak III, MD (11/04/17 12:08:48)                 Impression:     Chronic change.      Electronically signed by: ZAC NAYAK MD  Date:     11/04/17  Time:    12:08              Narrative:    Findings: There is a remote infarct of the anterior limb of the right internal capsule. No bleed, mass, or mass effect seen. No skull lesion or skull fracture seen.                                      Scribe Attestation:   Scribe #1: I performed the above scribed service and the  documentation accurately describes the services I performed. I attest to the accuracy of the note.    I, Dr. Farideh Ramirez, reviewed documentation as scribed above. I personally performed the services described in this documentation.  I agree that the record reflects my personal performance and is accurate and complete. Farideh Ramirez MD.  11/05/2017 2:06 PM          ED Course      Clinical Impression:     1. Near syncope    2. Dizziness    3. Dehydration    4. Steatosis of liver                               Farideh Ramirez MD  11/05/17 2384

## 2017-11-06 LAB
HAV IGM SERPL QL IA: NEGATIVE
HBV CORE IGM SERPL QL IA: NEGATIVE
HBV SURFACE AG SERPL QL IA: NEGATIVE
HCV AB SERPL QL IA: NEGATIVE

## 2018-01-15 ENCOUNTER — HOSPITAL ENCOUNTER (OUTPATIENT)
Facility: HOSPITAL | Age: 53
Discharge: HOME | DRG: 683 | End: 2018-01-16
Attending: EMERGENCY MEDICINE | Admitting: EMERGENCY MEDICINE
Payer: MEDICAID

## 2018-01-15 DIAGNOSIS — R42 LIGHTHEADEDNESS: ICD-10-CM

## 2018-01-15 DIAGNOSIS — R19.7 DIARRHEA, UNSPECIFIED TYPE: ICD-10-CM

## 2018-01-15 DIAGNOSIS — N17.9 AKI (ACUTE KIDNEY INJURY): Primary | ICD-10-CM

## 2018-01-15 PROBLEM — K76.0 HEPATIC STEATOSIS: Status: ACTIVE | Noted: 2018-01-15

## 2018-01-15 PROBLEM — R74.8 ELEVATED LIVER ENZYMES: Status: ACTIVE | Noted: 2018-01-15

## 2018-01-15 PROBLEM — Z86.73 HISTORY OF STROKE: Status: ACTIVE | Noted: 2018-01-15

## 2018-01-15 LAB
ALBUMIN SERPL BCP-MCNC: 3.1 G/DL
ALP SERPL-CCNC: 239 U/L
ALT SERPL W/O P-5'-P-CCNC: 94 U/L
ANION GAP SERPL CALC-SCNC: 13 MMOL/L
AST SERPL-CCNC: 284 U/L
BACTERIA #/AREA URNS AUTO: ABNORMAL /HPF
BASOPHILS # BLD AUTO: 0.02 K/UL
BASOPHILS NFR BLD: 0.3 %
BILIRUB SERPL-MCNC: 1.6 MG/DL
BILIRUB UR QL STRIP: NEGATIVE
BUN SERPL-MCNC: 24 MG/DL
CALCIUM SERPL-MCNC: 8.7 MG/DL
CHLORIDE SERPL-SCNC: 95 MMOL/L
CLARITY UR REFRACT.AUTO: ABNORMAL
CO2 SERPL-SCNC: 21 MMOL/L
COLOR UR AUTO: ABNORMAL
CREAT SERPL-MCNC: 3.7 MG/DL
DIFFERENTIAL METHOD: ABNORMAL
EOSINOPHIL # BLD AUTO: 0 K/UL
EOSINOPHIL NFR BLD: 0.4 %
ERYTHROCYTE [DISTWIDTH] IN BLOOD BY AUTOMATED COUNT: 12.8 %
EST. GFR  (AFRICAN AMERICAN): 20.8 ML/MIN/1.73 M^2
EST. GFR  (NON AFRICAN AMERICAN): 18 ML/MIN/1.73 M^2
GLUCOSE SERPL-MCNC: 121 MG/DL
GLUCOSE UR QL STRIP: ABNORMAL
HCT VFR BLD AUTO: 36.8 %
HGB BLD-MCNC: 13.3 G/DL
HGB UR QL STRIP: ABNORMAL
HYALINE CASTS UR QL AUTO: 50 /LPF
IMM GRANULOCYTES # BLD AUTO: 0.02 K/UL
IMM GRANULOCYTES NFR BLD AUTO: 0.3 %
INR PPP: 1.4
KETONES UR QL STRIP: ABNORMAL
LACTATE SERPL-SCNC: 1.8 MMOL/L
LEUKOCYTE ESTERASE UR QL STRIP: ABNORMAL
LIPASE SERPL-CCNC: 43 U/L
LYMPHOCYTES # BLD AUTO: 2.9 K/UL
LYMPHOCYTES NFR BLD: 36.5 %
MCH RBC QN AUTO: 35.6 PG
MCHC RBC AUTO-ENTMCNC: 36.1 G/DL
MCV RBC AUTO: 98 FL
MICROSCOPIC COMMENT: ABNORMAL
MONOCYTES # BLD AUTO: 0.6 K/UL
MONOCYTES NFR BLD: 7.9 %
NEUTROPHILS # BLD AUTO: 4.4 K/UL
NEUTROPHILS NFR BLD: 54.6 %
NITRITE UR QL STRIP: NEGATIVE
NRBC BLD-RTO: 0 /100 WBC
PH UR STRIP: 5 [PH] (ref 5–8)
PLATELET # BLD AUTO: 146 K/UL
PMV BLD AUTO: 11.4 FL
POTASSIUM SERPL-SCNC: 3.7 MMOL/L
PROT SERPL-MCNC: 7.4 G/DL
PROT UR QL STRIP: ABNORMAL
PROTHROMBIN TIME: 14 SEC
RBC # BLD AUTO: 3.74 M/UL
RBC #/AREA URNS AUTO: 3 /HPF (ref 0–4)
SODIUM SERPL-SCNC: 129 MMOL/L
SP GR UR STRIP: 1.02 (ref 1–1.03)
SQUAMOUS #/AREA URNS AUTO: 5 /HPF
TROPONIN I SERPL DL<=0.01 NG/ML-MCNC: 0.01 NG/ML
TROPONIN I SERPL DL<=0.01 NG/ML-MCNC: 0.04 NG/ML
URN SPEC COLLECT METH UR: ABNORMAL
UROBILINOGEN UR STRIP-ACNC: NEGATIVE EU/DL
WBC # BLD AUTO: 7.99 K/UL
WBC #/AREA URNS AUTO: 14 /HPF (ref 0–5)

## 2018-01-15 PROCEDURE — 99285 EMERGENCY DEPT VISIT HI MDM: CPT | Mod: ,,, | Performed by: EMERGENCY MEDICINE

## 2018-01-15 PROCEDURE — 25000003 PHARM REV CODE 250: Performed by: INTERNAL MEDICINE

## 2018-01-15 PROCEDURE — 36415 COLL VENOUS BLD VENIPUNCTURE: CPT

## 2018-01-15 PROCEDURE — G0378 HOSPITAL OBSERVATION PER HR: HCPCS

## 2018-01-15 PROCEDURE — 81001 URINALYSIS AUTO W/SCOPE: CPT

## 2018-01-15 PROCEDURE — 85610 PROTHROMBIN TIME: CPT

## 2018-01-15 PROCEDURE — 85025 COMPLETE CBC W/AUTO DIFF WBC: CPT

## 2018-01-15 PROCEDURE — 20600001 HC STEP DOWN PRIVATE ROOM

## 2018-01-15 PROCEDURE — 63600175 PHARM REV CODE 636 W HCPCS: Performed by: INTERNAL MEDICINE

## 2018-01-15 PROCEDURE — 87086 URINE CULTURE/COLONY COUNT: CPT

## 2018-01-15 PROCEDURE — 63600175 PHARM REV CODE 636 W HCPCS: Performed by: EMERGENCY MEDICINE

## 2018-01-15 PROCEDURE — 83605 ASSAY OF LACTIC ACID: CPT

## 2018-01-15 PROCEDURE — 84484 ASSAY OF TROPONIN QUANT: CPT

## 2018-01-15 PROCEDURE — 84484 ASSAY OF TROPONIN QUANT: CPT | Mod: 91

## 2018-01-15 PROCEDURE — 93010 ELECTROCARDIOGRAM REPORT: CPT | Mod: ,,, | Performed by: INTERNAL MEDICINE

## 2018-01-15 PROCEDURE — 96361 HYDRATE IV INFUSION ADD-ON: CPT

## 2018-01-15 PROCEDURE — 99284 EMERGENCY DEPT VISIT MOD MDM: CPT | Mod: 25

## 2018-01-15 PROCEDURE — 93005 ELECTROCARDIOGRAM TRACING: CPT | Mod: 59

## 2018-01-15 PROCEDURE — 25000003 PHARM REV CODE 250: Performed by: EMERGENCY MEDICINE

## 2018-01-15 PROCEDURE — 80053 COMPREHEN METABOLIC PANEL: CPT

## 2018-01-15 PROCEDURE — 99223 1ST HOSP IP/OBS HIGH 75: CPT | Mod: ,,, | Performed by: INTERNAL MEDICINE

## 2018-01-15 PROCEDURE — 96374 THER/PROPH/DIAG INJ IV PUSH: CPT

## 2018-01-15 PROCEDURE — 83690 ASSAY OF LIPASE: CPT

## 2018-01-15 RX ORDER — CEFTRIAXONE 1 G/1
1 INJECTION, POWDER, FOR SOLUTION INTRAMUSCULAR; INTRAVENOUS
Status: COMPLETED | OUTPATIENT
Start: 2018-01-15 | End: 2018-01-15

## 2018-01-15 RX ORDER — CEFTRIAXONE 1 G/50ML
1 INJECTION, SOLUTION INTRAVENOUS
Status: DISCONTINUED | OUTPATIENT
Start: 2018-01-15 | End: 2018-01-16 | Stop reason: HOSPADM

## 2018-01-15 RX ORDER — ONDANSETRON 8 MG/1
8 TABLET, ORALLY DISINTEGRATING ORAL EVERY 8 HOURS PRN
Status: DISCONTINUED | OUTPATIENT
Start: 2018-01-15 | End: 2018-01-15

## 2018-01-15 RX ORDER — CARVEDILOL 6.25 MG/1
12.5 TABLET ORAL 2 TIMES DAILY WITH MEALS
Status: DISCONTINUED | OUTPATIENT
Start: 2018-01-15 | End: 2018-01-16 | Stop reason: HOSPADM

## 2018-01-15 RX ORDER — ONDANSETRON 2 MG/ML
4 INJECTION INTRAMUSCULAR; INTRAVENOUS EVERY 12 HOURS PRN
Status: DISCONTINUED | OUTPATIENT
Start: 2018-01-15 | End: 2018-01-16 | Stop reason: HOSPADM

## 2018-01-15 RX ORDER — NAPROXEN SODIUM 220 MG/1
81 TABLET, FILM COATED ORAL DAILY
Status: DISCONTINUED | OUTPATIENT
Start: 2018-01-16 | End: 2018-01-16 | Stop reason: HOSPADM

## 2018-01-15 RX ORDER — SODIUM CHLORIDE 0.9 % (FLUSH) 0.9 %
5 SYRINGE (ML) INJECTION
Status: DISCONTINUED | OUTPATIENT
Start: 2018-01-15 | End: 2018-01-16 | Stop reason: HOSPADM

## 2018-01-15 RX ORDER — ACETAMINOPHEN 500 MG
500 TABLET ORAL EVERY 6 HOURS PRN
Status: DISCONTINUED | OUTPATIENT
Start: 2018-01-15 | End: 2018-01-16 | Stop reason: HOSPADM

## 2018-01-15 RX ORDER — SODIUM CHLORIDE 9 MG/ML
INJECTION, SOLUTION INTRAVENOUS CONTINUOUS
Status: DISCONTINUED | OUTPATIENT
Start: 2018-01-15 | End: 2018-01-16 | Stop reason: HOSPADM

## 2018-01-15 RX ORDER — ONDANSETRON 8 MG/1
8 TABLET, ORALLY DISINTEGRATING ORAL EVERY 8 HOURS PRN
Status: DISCONTINUED | OUTPATIENT
Start: 2018-01-15 | End: 2018-01-16 | Stop reason: HOSPADM

## 2018-01-15 RX ORDER — SODIUM CHLORIDE 9 MG/ML
INJECTION, SOLUTION INTRAVENOUS CONTINUOUS
Status: DISCONTINUED | OUTPATIENT
Start: 2018-01-15 | End: 2018-01-15

## 2018-01-15 RX ORDER — RAMELTEON 8 MG/1
8 TABLET ORAL NIGHTLY PRN
Status: DISCONTINUED | OUTPATIENT
Start: 2018-01-15 | End: 2018-01-16 | Stop reason: HOSPADM

## 2018-01-15 RX ORDER — HEPARIN SODIUM 5000 [USP'U]/ML
5000 INJECTION, SOLUTION INTRAVENOUS; SUBCUTANEOUS EVERY 8 HOURS
Status: DISCONTINUED | OUTPATIENT
Start: 2018-01-15 | End: 2018-01-16 | Stop reason: HOSPADM

## 2018-01-15 RX ORDER — THIAMINE HCL 100 MG
100 TABLET ORAL DAILY
Status: DISCONTINUED | OUTPATIENT
Start: 2018-01-16 | End: 2018-01-16 | Stop reason: HOSPADM

## 2018-01-15 RX ADMIN — CEFTRIAXONE SODIUM 1 G: 1 INJECTION, POWDER, FOR SOLUTION INTRAMUSCULAR; INTRAVENOUS at 08:01

## 2018-01-15 RX ADMIN — SODIUM CHLORIDE 1000 ML: 0.9 INJECTION, SOLUTION INTRAVENOUS at 08:01

## 2018-01-15 RX ADMIN — SODIUM CHLORIDE: 0.9 INJECTION, SOLUTION INTRAVENOUS at 09:01

## 2018-01-15 RX ADMIN — SODIUM CHLORIDE 1000 ML: 0.9 INJECTION, SOLUTION INTRAVENOUS at 06:01

## 2018-01-15 RX ADMIN — HEPARIN SODIUM 5000 UNITS: 5000 INJECTION, SOLUTION INTRAVENOUS; SUBCUTANEOUS at 10:01

## 2018-01-15 RX ADMIN — SODIUM CHLORIDE: 0.9 INJECTION, SOLUTION INTRAVENOUS at 04:01

## 2018-01-15 RX ADMIN — CARVEDILOL 12.5 MG: 6.25 TABLET, FILM COATED ORAL at 04:01

## 2018-01-15 NOTE — ED NOTES
Pt transported to room 1047 A via wheelchair with nurse Pt has 1 L NS infusing at 125 ml per hr per pump per left forearm saline lock with 830 ml left TBA  Pt condition stable on transport, pt belongings are with pt and pt will notify his family of room number upon arrival to floor

## 2018-01-15 NOTE — ED PROVIDER NOTES
Encounter Date: 1/15/2018    SCRIBE #1 NOTE: I, Christina Ruvalcaba, am scribing for, and in the presence of,  Dr. Brown. I have scribed the entire note.       History     Chief Complaint   Patient presents with    Blurred Vision     Pt reports sudden onset of blurred vision accompanied by dizziness for approximately 10-15 minutes that began yesterday and is now happening again. Pt also reports bilateral leg weakness.      Time seen by provider: 6:18 AM    This is a 52 y.o. male with medication problems including HTN and history of ischemic stroke who presents with complaint of acute blurred vision and dizziness that began yesterday. Symptoms are intermittent and last 10-15 minutes. Pt also complains of bilateral proximal leg weakness and states he feels unsteady. He denies any hand weakness, nausea, vomiting, or SOB. Pt had similar episode in November and was diagnosed with near syncope. No alcohol use.       The history is provided by the patient and medical records.     Review of patient's allergies indicates:   Allergen Reactions    Iodine and iodide containing products Hives     Past Medical History:   Diagnosis Date    Hypertension     Ischemic stroke     Kidney stone      Past Surgical History:   Procedure Laterality Date    ANKLE FRACTURE SURGERY Left     kidney stone removal       No family history on file.  Social History   Substance Use Topics    Smoking status: Never Smoker    Smokeless tobacco: Never Used    Alcohol use 2.4 oz/week     4 Cans of beer per week      Comment: occasionally     Review of Systems   Constitutional: Negative for fever.   HENT: Negative for nosebleeds.    Eyes: Positive for visual disturbance (Blurred).   Respiratory: Negative for shortness of breath.    Cardiovascular: Negative for chest pain.   Gastrointestinal: Negative for nausea and vomiting.   Genitourinary: Negative for dysuria.   Musculoskeletal: Negative for back pain.   Skin: Negative for rash.   Neurological:  Positive for dizziness and weakness (proximal legs).       Physical Exam     Initial Vitals [01/15/18 0606]   BP Pulse Resp Temp SpO2   100/63 89 16 98.2 °F (36.8 °C) 98 %      MAP       75.33         Physical Exam    Nursing note and vitals reviewed.  Constitutional: He appears well-developed and well-nourished. He is not diaphoretic. No distress.   HENT:   Head: Normocephalic and atraumatic.   Mouth/Throat: Oropharynx is clear and moist.   Eyes: Conjunctivae and EOM are normal. Pupils are equal, round, and reactive to light.   Neck: Normal range of motion. Neck supple. No JVD present.   Cardiovascular: Normal rate, regular rhythm and normal heart sounds.   No murmur heard.  Pulmonary/Chest: Breath sounds normal. No respiratory distress. He has no wheezes. He has no rhonchi. He has no rales.   Abdominal: Soft. Bowel sounds are normal. He exhibits no distension and no mass. There is no tenderness. There is no rebound and no guarding.   Musculoskeletal: Normal range of motion. He exhibits no edema or tenderness.   Extremities are atraumatic.   Neurological: He is alert and oriented to person, place, and time. No cranial nerve deficit or sensory deficit.   Skin: Skin is warm and dry. No rash noted.   Psychiatric: He has a normal mood and affect.         ED Course   Procedures  Labs Reviewed   CBC W/ AUTO DIFFERENTIAL - Abnormal; Notable for the following:        Result Value    RBC 3.74 (*)     Hemoglobin 13.3 (*)     Hematocrit 36.8 (*)     MCH 35.6 (*)     MCHC 36.1 (*)     Platelets 146 (*)     All other components within normal limits   COMPREHENSIVE METABOLIC PANEL - Abnormal; Notable for the following:     Sodium 129 (*)     CO2 21 (*)     Glucose 121 (*)     BUN, Bld 24 (*)     Creatinine 3.7 (*)     Albumin 3.1 (*)     Total Bilirubin 1.6 (*)     Alkaline Phosphatase 239 (*)      (*)     ALT 94 (*)     eGFR if  20.8 (*)     eGFR if non  18.0 (*)     All other components  within normal limits   URINALYSIS, REFLEX TO URINE CULTURE - Abnormal; Notable for the following:     Appearance, UA Cloudy (*)     Protein, UA 1+ (*)     Glucose, UA 1+ (*)     Ketones, UA Trace (*)     Occult Blood UA 1+ (*)     Leukocytes, UA Trace (*)     All other components within normal limits    Narrative:     Preferred Collection Type->Urine, Clean Catch   URINALYSIS MICROSCOPIC - Abnormal; Notable for the following:     WBC, UA 14 (*)     Bacteria, UA Many (*)     Hyaline Casts, UA 50 (*)     All other components within normal limits    Narrative:     Preferred Collection Type->Urine, Clean Catch   PROTIME-INR - Abnormal; Notable for the following:     Prothrombin Time 14.0 (*)     INR 1.4 (*)     All other components within normal limits   TROPONIN I - Abnormal; Notable for the following:     Troponin I 0.044 (*)     All other components within normal limits    Narrative:     troponin add on per Jose M Brown MD @ 09:41 on 01/15/2018; order#   [id number]   CULTURE, URINE   LIPASE   TROPONIN I     EKG Readings: (Independently Interpreted)   Normal sinus rhythm with some small lateral T-wave inversions similar to previous EKG.           Medical Decision Making:   History:   Old Medical Records: I decided to obtain old medical records.  Initial Assessment:   Pt with lightheadedness and dizziness. 1 episode today. He feels weak in both proximal legs but not so much the distal legs. No arm weakness. Pt had similar episode in November per my chart review and extensive workup showed nothing. Given fluids and discharged. Symptoms are bilateral and not consistent with stroke so no CT needed even with history of stroke. Will give fluids, check EKG, get labs, and reassess.     8:22 AM   Labs show creatine of 3.7 which is markedly elevated from baseline of 1.4. Will admit to medicine for evaluation of acute kidney injury.     8:30 AM   Urine shows possible UTI, given RAMY will give dose of antibiotic. Cultures sent.  Pt reports that 2 days ago he had blood clots coming out of nose and episode of vomiting. H&H is pretty close to his normal and no symptoms since then. Will add on PT INR and refer rest of workup to internal medicine.   Independently Interpreted Test(s):   I have ordered and independently interpreted EKG Reading(s) - see prior notes  Clinical Tests:   Lab Tests: Ordered and Reviewed  Medical Tests: Ordered and Reviewed            Scribe Attestation:   Scribe #1: I performed the above scribed service and the documentation accurately describes the services I performed. I attest to the accuracy of the note.            ED Course      Clinical Impression:   The primary encounter diagnosis was RAMY (acute kidney injury). A diagnosis of Lightheadedness was also pertinent to this visit.    Disposition:   Disposition: Admitted  Internal medicine                        Jose M Brown MD  01/15/18 9322

## 2018-01-15 NOTE — ED NOTES
Appearance: Patient resting comfortably on stretcher, and is in no acute distress at this time. Patient is clean and well groomed, with clothing properly fastened. Patient has no facial grimacing, and no indications of being in pain currently.    Cardiac: Heart sounds audible and without abnormalities noted. Patient attached to continuous cardiac monitor, automatic/cycling BP cuff, and continuous pulse ox. Patient has a normal rate and regular rhythm. No peripheral edema noted.    Neuro/LOC: Eyes open spontaneously, behavior appropriate to situation, follows commands, facial expression symmetrical, purposeful motor response noted. AAOx4; The patient is awake, alert and aware of environment with an appropriate affect, and speaking appropriately. Patient denies HA. +Dizziness.    Respiratory: Breath sounds audible, equal and clear bilaterally. Airway is open and patent, respirations are spontaneous, even, and unlabored. Normal effort and rate noted, and without accessory muscle use.    Skin: Intact, warm and dry. Color is consistent with ethnicity. Normal skin turgor and moist mucous membranes.    Gastro: Bowel sounds audible and active x4 quadrants. No tenderness or distention present.    Musculoskeletal: Patient moving all extremities spontaneously. No obvious swelling or deformities noted.     Peripheral vascular: Pulses +1 x4 upper/lower extremities.     -Patient identifiers verified and correct for this patient.  -Patient resting with bedrails up x2 for safety, bed locked in low position, and call bell within reach.

## 2018-01-15 NOTE — ED NOTES
Chief Complaint   Patient presents with    Blurred Vision     Pt reports sudden onset of blurred vision accompanied by dizziness for approximately 10-15 minutes that began yesterday and is now happening again. Pt also reports bilateral leg weakness.      Patient reports dizziness and blurry vision x1 day. +Stroke HX. Patient AAOx4 at this time. Reports NV x1day.    Active Ambulatory Problems     Diagnosis Date Noted    Benign essential hypertension 04/19/2016    Alcohol abuse 04/19/2016    Ischemic stroke 01/21/2017    Bilateral carotid artery stenosis      Resolved Ambulatory Problems     Diagnosis Date Noted    Acute chest pain 04/18/2016     Past Medical History:   Diagnosis Date    Hypertension     Ischemic stroke     Kidney stone      Review of patient's allergies indicates:   Allergen Reactions    Iodine and iodide containing products Hives

## 2018-01-15 NOTE — H&P
[facility] Hospital Medicine  History & Physical    Patient Name: Tonya Wayne  MRN: [id number]  Admission Date: 1/15/2018  Attending Physician: Jaquelin Hurtado MD   Primary Care Provider: Marii MCKNIGHT [facility] Medicine Team: [facility] MED D Jaquelin Hurtado MD     Patient information was obtained from patient, past medical records and ER records.     Subjective:     Principal Problem:RAMY (acute kidney injury)    Chief Complaint:   Chief Complaint   Patient presents with    Blurred Vision     Pt reports sudden onset of blurred vision accompanied by dizziness for approximately 10-15 minutes that began yesterday and is now happening again. Pt also reports bilateral leg weakness.         HPI: 52 y.o. male presented to the ER with past medical history of hypertension and stroke. He was in his usual state of stable health until the sudden onset of intermittent blurred vision, dizziness, and weakness beginning 1 day prior to admission. He had a similar episode in November. Pertinent associated symptoms include diarrhea for the past week, nausea and vomiting for 2 days. Patient takes ACEi. He denies current use of NSAIDs. Patient received IV fluids in ED and felt better. Labs revealed elevated creatinine consistent with RAMY.    Past Medical History:   Diagnosis Date    Hypertension     Ischemic stroke     Kidney stone        Past Surgical History:   Procedure Laterality Date    ANKLE FRACTURE SURGERY Left     kidney stone removal         Review of patient's allergies indicates:   Allergen Reactions    Iodine and iodide containing products Hives       No current facility-administered medications on file prior to encounter.      Current Outpatient Prescriptions on File Prior to Encounter   Medication Sig    carvedilol (COREG) 6.25 MG tablet Take 1 tablet (6.25 mg total) by mouth 2 (two) times daily with meals.    lisinopril (PRINIVIL,ZESTRIL) 20 MG tablet Take 1 tablet  (20 mg total) by mouth once daily.    aspirin 81 MG Chew Take 1 tablet (81 mg total) by mouth once daily.    thiamine 100 MG tablet Take 1 tablet (100 mg total) by mouth once daily.    [DISCONTINUED] ibuprofen (ADVIL,MOTRIN) 800 MG tablet Take 1 tablet (800 mg total) by mouth every 6 (six) hours as needed for Pain.    [DISCONTINUED] oxycodone-acetaminophen (PERCOCET) 5-325 mg per tablet Take 1 tablet by mouth every 6 (six) hours as needed for Pain.     Family History     None        Social History Main Topics    Smoking status: Never Smoker    Smokeless tobacco: Never Used    Alcohol use 2.4 oz/week     4 Cans of beer per week      Comment: occasionally    Drug use: No    Sexual activity: Not on file     Review of Systems   HENT: Negative.    Eyes: Positive for visual disturbance.   Respiratory: Negative.    Cardiovascular: Negative.    Gastrointestinal: Positive for diarrhea, nausea and vomiting. Negative for abdominal pain.   Musculoskeletal: Negative.    Skin: Negative.    Allergic/Immunologic: Negative for immunocompromised state.   Neurological: Positive for dizziness and weakness.     Objective:     Vital Signs (Most Recent):  Temp: 98.8 °F (37.1 °C) (01/15/18 1547)  Pulse: 86 (01/15/18 1547)  Resp: 16 (01/15/18 1547)  BP: (!) 160/103 (01/15/18 1547)  SpO2: 99 % (01/15/18 1547) Vital Signs (24h Range):  Temp:  [97.8 °F (36.6 °C)-98.9 °F (37.2 °C)] 98.8 °F (37.1 °C)  Pulse:  [78-89] 86  Resp:  [16] 16  SpO2:  [97 %-99 %] 99 %  BP: ()/() 160/103     Weight: 94.2 kg (207 lb 10.8 oz)  Body mass index is 26.66 kg/m².    Physical Exam   Constitutional: He appears well-developed.  Non-toxic appearance.   HENT:   Head: Normocephalic.   Mouth/Throat: Mucous membranes are not pale and not cyanotic. Abnormal dentition. Dental caries present.   Eyes: Conjunctivae and lids are normal. Pupils are equal.   Neck: Neck supple.   Cardiovascular: Normal rate, regular rhythm, S1 normal and S2 normal.     Pulmonary/Chest: Effort normal and breath sounds normal.   Abdominal: Soft. Bowel sounds are normal. There is no tenderness.   Musculoskeletal: He exhibits no edema.   Neurological: He is not disoriented.   Skin: Skin is warm and dry. No cyanosis. Nails show no clubbing.   Psychiatric: He has a normal mood and affect.         CRANIAL NERVES     CN III, IV, VI   Pupils: equal      Significant Labs:     CBC:   Recent Labs  Lab 01/15/18  0630   WBC 7.99   HGB 13.3*   HCT 36.8*   *     CMP:   Recent Labs  Lab 01/15/18  0630   *   K 3.7   CL 95   CO2 21*   *   BUN 24*   CREATININE 3.7*   CALCIUM 8.7   PROT 7.4   ALBUMIN 3.1*   BILITOT 1.6*   ALKPHOS 239*   *   ALT 94*   ANIONGAP 13   EGFRNONAA 18.0*     Coagulation:   Recent Labs  Lab 01/15/18  0837   INR 1.4*     Lactic Acid:   Recent Labs  Lab 01/15/18  1651   LACTATE 1.8     Lipase:   Recent Labs  Lab 01/15/18  0630   LIPASE 43     Troponin:   Recent Labs  Lab 01/15/18  0630 01/15/18  1651   TROPONINI 0.044* 0.012     Urine Studies:   Recent Labs  Lab 01/15/18  0656   COLORU Sue   APPEARANCEUA Cloudy*   PHUR 5.0   SPECGRAV 1.020   PROTEINUA 1+*   GLUCUA 1+*   KETONESU Trace*   BILIRUBINUA Negative   OCCULTUA 1+*   NITRITE Negative   UROBILINOGEN Negative   LEUKOCYTESUR Trace*   RBCUA 3   WBCUA 14*   BACTERIA Many*   SQUAMEPITHEL 5   HYALINECASTS 50*     Baselines:  Hemoglobin   Date Value Ref Range Status   01/15/2018 13.3 (L) 14.0 - 18.0 g/dL Final   11/04/2017 12.4 (L) 14.0 - 18.0 g/dL Final   01/23/2017 13.9 (L) 14.0 - 18.0 g/dL Final   01/22/2017 14.3 14.0 - 18.0 g/dL Final     Creatinine   Date Value Ref Range Status   01/15/2018 3.7 (H) 0.5 - 1.4 mg/dL Final   11/04/2017 1.4 0.5 - 1.4 mg/dL Final   01/23/2017 1.2 0.5 - 1.4 mg/dL Final   01/22/2017 1.1 0.5 - 1.4 mg/dL Final     Diagnostics:  EF   Date Value Ref Range Status   01/23/2017 65 55 - 65    04/19/2016 56 55 - 65      Assessment/Plan:     Current Hospital Problem  List:    Active Hospital Problems    Diagnosis  POA    *RAMY (acute kidney injury) [N17.9]  Yes     Priority: 1 - High    Diarrhea [R19.7]  Yes     Priority: 2     Benign essential hypertension [I10]  Yes     Priority: 3     History of stroke [Z86.73]  Not Applicable     Priority: 4     Hepatic steatosis [K76.0]  Yes    Elevated liver enzymes [R74.8]  Yes      Resolved Hospital Problems    Diagnosis Date Resolved POA   No resolved problems to display.       Ordered Medications for management of current problems:    [START ON 1/16/2018] aspirin  81 mg Oral Daily    carvedilol  12.5 mg Oral BID WM    cefTRIAXone (ROCEPHIN) IVPB  1 g Intravenous Q24H    heparin (porcine)  5,000 Units Subcutaneous Q8H    [START ON 1/16/2018] thiamine  100 mg Oral Daily       IV Fluids: 0.9 NS    Risk  Patient has a condition that poses threat to life and bodily function: Acute Renal Failure    Anticipated Disposition: Home or Self Care    Assessment and Plan by Problem:     RAMY (acute kidney injury), Possible UTI  Likely pre-renal etiology. Continuing current management with NS. Holding ACEi. Consider Nephrology consult if no improvement with IV fluids. US consistent with chronic medical renal disease. Empiric treatment for UTI, culture pending.     Benign essential hypertension:  Continuing current home management with Coreg (increased dose as ACEi held).     Diarrhea  Likely etiology of volume depletion. Stool studies pending.     Hepatic steatosis  Elevated liver enzymes:  Patient denies recent heavy alcohol intake. Trend liver enzymes.      VTE Risk Mitigation         Ordered     heparin (porcine) injection 5,000 Units  Every 8 hours     Route:  Subcutaneous        01/15/18 1602     Medium Risk of VTE  Once      01/15/18 1602            Jaquelin Hurtado MD  Department of Hospital Medicine   [facility]

## 2018-01-16 VITALS
WEIGHT: 212.75 LBS | HEIGHT: 74 IN | OXYGEN SATURATION: 95 % | DIASTOLIC BLOOD PRESSURE: 97 MMHG | BODY MASS INDEX: 27.3 KG/M2 | RESPIRATION RATE: 18 BRPM | SYSTOLIC BLOOD PRESSURE: 145 MMHG | HEART RATE: 82 BPM | TEMPERATURE: 100 F

## 2018-01-16 LAB
ALBUMIN SERPL BCP-MCNC: 2.8 G/DL
ALP SERPL-CCNC: 219 U/L
ALT SERPL W/O P-5'-P-CCNC: 100 U/L
ANION GAP SERPL CALC-SCNC: 15 MMOL/L
AST SERPL-CCNC: 356 U/L
BACTERIA UR CULT: NORMAL
BASOPHILS # BLD AUTO: 0.03 K/UL
BASOPHILS NFR BLD: 0.4 %
BILIRUB SERPL-MCNC: 1.5 MG/DL
BUN SERPL-MCNC: 18 MG/DL
CALCIUM SERPL-MCNC: 8.4 MG/DL
CHLORIDE SERPL-SCNC: 104 MMOL/L
CO2 SERPL-SCNC: 18 MMOL/L
CREAT SERPL-MCNC: 1.5 MG/DL
DIFFERENTIAL METHOD: ABNORMAL
EOSINOPHIL # BLD AUTO: 0.1 K/UL
EOSINOPHIL NFR BLD: 0.7 %
ERYTHROCYTE [DISTWIDTH] IN BLOOD BY AUTOMATED COUNT: 13.2 %
EST. GFR  (AFRICAN AMERICAN): >60 ML/MIN/1.73 M^2
EST. GFR  (NON AFRICAN AMERICAN): 53.5 ML/MIN/1.73 M^2
GLUCOSE SERPL-MCNC: 103 MG/DL
HCT VFR BLD AUTO: 33.2 %
HGB BLD-MCNC: 11.8 G/DL
HIV1+2 IGG SERPL QL IA.RAPID: NEGATIVE
IMM GRANULOCYTES # BLD AUTO: 0.02 K/UL
IMM GRANULOCYTES NFR BLD AUTO: 0.3 %
LYMPHOCYTES # BLD AUTO: 3.2 K/UL
LYMPHOCYTES NFR BLD: 44.9 %
MAGNESIUM SERPL-MCNC: 2.1 MG/DL
MCH RBC QN AUTO: 35.8 PG
MCHC RBC AUTO-ENTMCNC: 35.5 G/DL
MCV RBC AUTO: 101 FL
MONOCYTES # BLD AUTO: 0.5 K/UL
MONOCYTES NFR BLD: 7.2 %
NEUTROPHILS # BLD AUTO: 3.3 K/UL
NEUTROPHILS NFR BLD: 46.5 %
NRBC BLD-RTO: 0 /100 WBC
PHOSPHATE SERPL-MCNC: 3.2 MG/DL
PLATELET # BLD AUTO: 131 K/UL
PMV BLD AUTO: 12.1 FL
POTASSIUM SERPL-SCNC: 3.5 MMOL/L
PROT SERPL-MCNC: 7.2 G/DL
RBC # BLD AUTO: 3.3 M/UL
SODIUM SERPL-SCNC: 137 MMOL/L
WBC # BLD AUTO: 7.04 K/UL

## 2018-01-16 PROCEDURE — 63600175 PHARM REV CODE 636 W HCPCS: Performed by: INTERNAL MEDICINE

## 2018-01-16 PROCEDURE — 97161 PT EVAL LOW COMPLEX 20 MIN: CPT

## 2018-01-16 PROCEDURE — 85025 COMPLETE CBC W/AUTO DIFF WBC: CPT

## 2018-01-16 PROCEDURE — 97165 OT EVAL LOW COMPLEX 30 MIN: CPT

## 2018-01-16 PROCEDURE — 25000003 PHARM REV CODE 250: Performed by: INTERNAL MEDICINE

## 2018-01-16 PROCEDURE — 36415 COLL VENOUS BLD VENIPUNCTURE: CPT

## 2018-01-16 PROCEDURE — G0378 HOSPITAL OBSERVATION PER HR: HCPCS

## 2018-01-16 PROCEDURE — 86703 HIV-1/HIV-2 1 RESULT ANTBDY: CPT

## 2018-01-16 PROCEDURE — 80053 COMPREHEN METABOLIC PANEL: CPT

## 2018-01-16 PROCEDURE — 83735 ASSAY OF MAGNESIUM: CPT

## 2018-01-16 PROCEDURE — 84100 ASSAY OF PHOSPHORUS: CPT

## 2018-01-16 PROCEDURE — 99239 HOSP IP/OBS DSCHRG MGMT >30: CPT | Mod: ,,, | Performed by: INTERNAL MEDICINE

## 2018-01-16 RX ADMIN — CARVEDILOL 12.5 MG: 6.25 TABLET, FILM COATED ORAL at 08:01

## 2018-01-16 RX ADMIN — CEFTRIAXONE SODIUM 1 G: 1 INJECTION, POWDER, FOR SOLUTION INTRAMUSCULAR; INTRAVENOUS at 09:01

## 2018-01-16 RX ADMIN — HEPARIN SODIUM 5000 UNITS: 5000 INJECTION, SOLUTION INTRAVENOUS; SUBCUTANEOUS at 05:01

## 2018-01-16 RX ADMIN — ASPIRIN 81 MG CHEWABLE TABLET 81 MG: 81 TABLET CHEWABLE at 09:01

## 2018-01-16 RX ADMIN — Medication 100 MG: at 09:01

## 2018-01-16 NOTE — PT/OT/SLP EVAL
Occupational Therapy   Evaluation and Discharge Note    Name: Tonya Wayne  MRN: [id number]  Admitting Diagnosis:  RAMY (acute kidney injury)      Recommendations:     Discharge Recommendations: home (no OT needed)  Discharge Equipment Recommendations:  none  Barriers to discharge:  None    History:     Occupational Profile:  Living Environment & PLOF: Pt resides with girlfriend & her son in 2 story house with 1 step to enter & 15 steps with (B) rails to get to 2nd floor of house.  Pt's bedroom & bathroom (1st & 2nd) are on 2nd floor however has rooms available on 1st floor if needed for bedroom & bathroom.  Pt is an active .  Pt works in [employer] at [employer].  Pt has no hobbies per pt.  Equipment Owned:  none      Past Medical History:   Diagnosis Date    Hypertension     Ischemic stroke     Kidney stone        Past Surgical History:   Procedure Laterality Date    ANKLE FRACTURE SURGERY Left     kidney stone removal         Subjective   Pt reported that he is fine.  Chief Complaint: none  Patient/Family stated goals: to get home soon  Communicated with: RN prior to session.  Pain/Comfort:  · Pain Rating 1: 0/10  · Pain Rating Post-Intervention 1: 0/10    Objective:     Patient found with: telemetry, peripheral IV    General Precautions: Standard, fall, contact     Occupational Performance:    Bed Mobility:    · Patient completed Supine to Sit with independence  · Patient completed Sit to Supine with independence    Functional Mobility/Transfers:  · Patient completed Sit <> Stand Transfer with independence  with  no assistive device     Activities of Daily Living:  · UB Dressing: independence while seated EOB  · LB Dressing: independence donning socks while seated EOB    Cognitive/Visual Perceptual:  Cognitive/Psychosocial Skills:     -       Oriented to: Person, Place, Time and Situation   -       Follows Commands/attention:Follows multistep  commands  -       Communication: clear/fluent  -       Memory: No  "Deficits noted  -       Safety awareness/insight to disability: intact   -       Mood/Affect/Coping skills/emotional control: Appropriate to situation  Visual/Perceptual:      -Intact    Physical Exam:  Postural examination/scapula alignment:    -       Rounded shoulders  -       Forward head  -       Posterior pelvic tilt  Skin integrity: Visible skin intact  Edema:  None noted  Sensation:    -       Intact  Dominant hand:    -       right  Upper Extremity Range of Motion:     -       Right Upper Extremity: WNL  -       Left Upper Extremity: WNL  Upper Extremity Strength:    -       Right Upper Extremity: WFL  -       Left Upper Extremity: WFL   Strength:    -       Right Upper Extremity: WFL  -       Left Upper Extremity: WFL  Fine Motor Coordination:    -       Intact  Gross motor coordination:   WFL    Patient left seated EOB with all lines intact, call button in reach, RN notified and white board updated.    [facility] 6 Click:  [facility] Total Score: 24    Treatment & Education:  Provided education regarding role of OT, POC, & discharge recommendations.  Education:    Assessment:     Tonya Wayne is a 52 y.o. male with a medical diagnosis of RAMY (acute kidney injury). At this time, patient is functioning at their prior level of function and does not require further acute OT services.     Clinical Decision Makin.  OT Low:  "Pt evaluation falls under low complexity for evaluation coding due to performance deficits noted in 1-3 areas as stated above and 0 co-morbities affecting current functional status. Data obtained from problem focused assessments. No modifications or assistance was required for completion of evaluation. Only brief occupational profile and history review completed."     Plan:     During this hospitalization, patient does not require further acute OT services.  Please re-consult if situation changes.    · Plan of Care Reviewed with: patient    This Plan of care has been discussed with the " patient who was involved in its development and understands and is in agreement with the identified goals and treatment plan    GOALS:    Occupational Therapy Goals     Not on file                Time Tracking:     OT Date of Treatment: 01/16/18  OT Start Time: 0901  OT Stop Time: 0916  OT Total Time (min): 15 min    Billable Minutes:Evaluation 15    COSME Noriega  1/16/2018

## 2018-01-16 NOTE — PLAN OF CARE
Problem: Patient Care Overview  Goal: Plan of Care Review  Outcome: Outcome(s) achieved Date Met: 01/16/18  OT eval completed.  No goals set.  Discharge OT due to pt independent with ADL's assessed at this time.  COSME Norigea 1/16/2018

## 2018-01-16 NOTE — PLAN OF CARE
01/16/18 1108   Discharge Assessment   Assessment Type Discharge Planning Assessment   Assessment information obtained from? Medical Record   Communicated expected length of stay with patient/caregiver yes   Prior to hospitilization cognitive status: Alert/Oriented   Prior to hospitalization functional status: Independent   Current cognitive status: Alert/Oriented   Current Functional Status: Independent   Able to Return to Prior Arrangements yes   Is patient able to care for self after discharge? Yes   Readmission Within The Last 30 Days no previous admission in last 30 days   Patient currently being followed by outpatient case management? No   Patient currently receives any other outside agency services? No   Equipment Currently Used at Home none   Do you have any problems affording any of your prescribed medications? No   Is the patient taking medications as prescribed? yes   Does the patient have transportation home? Yes   Does the patient receive services at the [facility]? No   Discharge Plan A Home   Discharge Plan B Home with family   Patient/Family In Agreement With Plan yes

## 2018-01-16 NOTE — NURSING
Pt has been discharged. Ldas removed. Discharge instructions given. Pt confirmed understanding by teach back method. Pt left out walking, per his request. Ride is present for pt.

## 2018-01-16 NOTE — PLAN OF CARE
Problem: Patient Care Overview  Goal: Plan of Care Review  Outcome: Ongoing (interventions implemented as appropriate)  Patient AAOx4. Patient hypertensive. Arrived from ED this afternoon. Patient oriented to room. Remains free of falls. Ambulatory. No complaints of pain at this time. Ultrasound ordered.

## 2018-01-16 NOTE — MEDICAL/APP STUDENT
"Progress Note  Hospital Medicine    Patient Name: Tonya Wayne  YOB: 1965    Admit Date: 1/15/2018                     LOS: 1    SUBJECTIVE:     Reason for Admission:  RAMY (acute kidney injury)    Hospital course:  "51 y/o man presented to the ER with past medical history of hypertension and stroke. In his normal state of health until sudden onset of intermittent blurred vision, dizziness, and weakness beginning 1 day prior to admission. He has a similar episode in November. Pertinent associated symptom include diarrhea for the past week, nausea and vomiting for 2 days. Patient takes ACEi. He denies current use of NSAIDS. Patient received IV fluids in ED and felt better. Labs revealed elevated creatinine consistent with RAMY."    Interval history: No events overnight. Patient reports his episodes of diarrhea usually last one week and reoccurs once monthly since November. No more reported dizziness, blurry vision, nausea, vomiting, or episodes of diarrhea since admission to the hospital. Patient reports drinking 2 40oz bottles daily.     Review of systems  General: no fatigue, weight change, weakness, or chills  Skin: no skin, nail, or hair changes  Head: negative  Ears: negative  Eye: no visual changes  Nose/sinus: negative  Cardiac: negative   Respiratory: no SOB, wheeze, cough, sputumus  GI: no [place],vomiting, indigestion, or diarrhea  Gu: no dysuria, anuria or oliguria        OBJECTIVE:     Vital Signs Range (Last 24H):  Temp:  [98 °F (36.7 °C)-99.6 °F (37.6 °C)]   Pulse:  [81-86]   Resp:  [15-18]   BP: ()/()   SpO2:  [95 %-99 %]       I & O (Last 24H):  Intake/Output Summary (Last 24 hours) at 01/16/18 1231  Last data filed at 01/16/18 0552   Gross per 24 hour   Intake          1576.67 ml   Output             1100 ml   Net           476.67 ml       Physical Exam:  Consitutional: appears well developed, well-nourished  Cardiovascular: normal rate, regular rhythm, normal S1,S2, no " murmurs   Resp: no  Respiratory distress, effort normal, breath sounds normal  Abdominal: soft, bowel sounds present. No tenderness     Diagnostic Results:      Recent Labs  Lab 01/16/18  0422         K 3.5      CO2 18*   BUN 18   CREATININE 1.5*   CALCIUM 8.4*   MG 2.1     AST: 284  ALT: 94  Suggestive of alcohol related transaminitits      ASSESSMENT/PLAN:     RAMY - resolved   -consult outpatient GI clinic for possible RAMY as a result of volume depletion due to diarrheal episodes    Recurring diarrhea   -consult outpatient GI clinic for etiology of diarrheal episodes    Elevated liver enzymes  - stop drinking alcohol     Blurry vision   -patient to consult opthalmology as an outpatient    Benign essential hypertension  -continue carvedilol  -continue aspirin       Active Hospital Problems    Diagnosis  POA    *RAMY (acute kidney injury) [N17.9]  Yes    History of stroke [Z86.73]  Not Applicable    Diarrhea [R19.7]  Yes    Hepatic steatosis [K76.0]  Yes    Elevated liver enzymes [R74.8]  Yes    Benign essential hypertension [I10]  Yes      Resolved Hospital Problems    Diagnosis Date Resolved POA   No resolved problems to display.        Aleksandra Salinas

## 2018-01-16 NOTE — PT/OT/SLP EVAL
Physical Therapy Evaluation and Discharge Note    Patient Name:  Tonya Wayne   MRN:  [id number]    Recommendations:     Discharge Recommendations:  home   Discharge Equipment Recommendations: none   Barriers to discharge: None     Assessment:     Tonya Wayne is a 52 y.o. male admitted with a medical diagnosis of RAMY (acute kidney injury). Pt tolerated session well with no complaints of SOB, dizziness, or instability noted. Pt demonstrated baseline mobility with no balance deficits and good safety awareness. Pt does not demonstrate a need for acute PT services. At this time, pt will be d/c from acute PT. Please re-consult should pt's functional status change. Recommend d/c to home with no need for further PT intervention.      Education:  Education provided to pt regarding: PT role/POC; safety with mobility. Verbalized understanding.     Whiteboard updated with correct mobility information. RN/PCT notified.  Appropriate to walk to the bathroom independently.     Please encourage pt to sit in bedside chair throughout the day to promote OOB mobility and decrease risk of deconditioning while in acute care.       Recent Surgery: * No surgery found *      Plan:     During this hospitalization, patient does not require further acute PT services.  Please re-consult if situation changes.     Plan of Care Reviewed with: patient    Subjective     Communicated with RN prior to session.  Patient found supine, upon PT entry to room, agreeable to evaluation.      Pt reported no pain or discomfort, just readiness to be home.   Patient comments/goals: To go home  Pain/Comfort: none    Patients cultural, spiritual, Adventism conflicts given the current situation: none stated    Living Environment:  Lives with: girlfriend  Lives in: 2SH; no CHERIE. Bed/bath located on 2nd floor, but can stay on 1st floor  PLOF/Level of assist: (I) with all mobility and ADLs prior to admit. 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   Bath:  tub/shower  DME: owns none    Roles/responsibilities: boyfriend; friend; employee; father  Hobbies/Interests: none stated    Assist available upon d/c: family able to assist as needed    Objective:     Patient found with: telemetry, peripheral IV     General Precautions: Standard, contact   Orthopedic Precautions:N/A   Braces: N/A     Exams:  Pt oriented x Person, Place, Time .     Communication: clear/fluent    Follows Commands: Follows multistep  commands    Posture: Rounded shoulder and Head forward  Skin Integrity: Visible skin intact  Edema:  none      Range of Motion and Strength Examination    Right Upper Extremity: WFL    Left Upper Extremity: WFL    Right Lower Extremity: WFL; 4+/5 strength grossly throughout    Left Lower Extremity: WFL; 4+/5 strength grossly throughout    Fine Motor Coordination:   intact      Gross Motor Coordination:  WFL      Functional Mobility:  Bed Mobility:  Supine to Sit:  Independent   Sit to supine: Independent   Scooting: Independent     Transfers:   Sit to stand:Independent with No Assistive Device from EOB         Gait:   Patient ambulated ~15 feet with No Assistive Device with Supervision or Set-up Assistance.  Pt demonstrated appropriate gait sequence with equal step length bilaterally and no LOB.        Balance:  Static Sitting: Independent   Dynamic Sitting: Independent   Static Standing: Independent   Dynamic Standing: Independent         AM-PAC 6 CLICK MOBILITY  Total Score:24       Patient left supine with all lines intact and call button in reach.    GOALS:    Physical Therapy Goals     Not on file          Multidisciplinary Problems (Resolved)        Problem: Physical Therapy Goal    Goal Priority Disciplines Outcome Goal Variances Interventions   Physical Therapy Goal   (Resolved)     PT/OT, PT Outcome(s) achieved     Description:      No acute PT intervention needed at this time. No goals established.                       History:     Past Medical History:    Diagnosis Date    Hypertension     Ischemic stroke     Kidney stone        Past Surgical History:   Procedure Laterality Date    ANKLE FRACTURE SURGERY Left     kidney stone removal         Clinical Decision Making:     Personal factors/comorbidities: HTN; h/o stroke. The listed co-morbidities and personal factors impact pt's current level and progress with functional mobility and independence.   Body systems elements affected: cardiovascular system; gastrointestinal system  Impairments: see assessment  Clinical Presentation: stable  Functional Outcome Tools: AMPAC, MMT, ROM  Evaluation Complexity Level: low    Time Tracking:     PT Received On: 01/16/18  PT Start Time: 0902     PT Stop Time: 0914  PT Total Time (min): 12 min     Billable Minutes: Evaluation 12      Payal Jones PT, DPT  Pager: [phone]  1/16/2018

## 2018-01-16 NOTE — PLAN OF CARE
Problem: Physical Therapy Goal  Goal: Physical Therapy Goal      No acute PT intervention needed at this time. No goals established.     Outcome: Outcome(s) achieved Date Met: 01/16/18      PT evaluation completed. Pt is supervision/mod (I) with all mobility and transfers. Pt does not demonstrate a need for skilled acute PT services. Eval/DC note to follow.     Payal Jnoes, PT, DPT  01/16/2018

## 2018-01-17 ENCOUNTER — PATIENT OUTREACH (OUTPATIENT)
Dept: ADMINISTRATIVE | Facility: CLINIC | Age: 53
End: 2018-01-17

## 2018-01-17 NOTE — PATIENT INSTRUCTIONS
Discharge Instructions for Acute Kidney Injury  You have been diagnosed with acute kidney injury. This means that your kidneys are not working properly. When both kidneys are healthy, they help filter out fluid and waste from the blood and body. Acute kidney injury has many causes. These include urinary blockages, infection, lack of enough blood supply, and medicines that can injure kidneys. In some cases, acute kidney injury is short-term (temporary), lasting several days to a few months. This is because the kidney can repair itself. But acute kidney injury can also result in chronic kidney disease or end stage renal failure. Here are some instructions for you to follow as you recover.  Home care  · Follow any instructions for eating and drinking given to you by your healthcare provider.  ¨ Drink less fluid, if instructed by your healthcare provider.  ¨ Keep a record of everything you eat and drink.  · Measure the amount of urine and stool you have each day.  · Weigh yourself every day, at the same time of day, and in the same kind of clothes. Keep a daily record of your daily weights.  · Take your temperature every day. Keep a record of the results.  · Learn to take your own blood pressure. Keep a record of your results. Ask your healthcare provider when you should seek emergency medical attention. Your provider will tell you which blood pressure reading is dangerous.  · Avoid contact with people who have infections (colds, bronchitis, or skin conditions).  · Practice good personal hygiene. This is especially important if you have a catheter in place when you leave the hospital. Doing so helps keep you safe from infection.  · Take your medicines exactly as directed.  · You may require frequent blood and urine tests to monitor your kidney function.  Follow-up care  Follow up with your healthcare provider, or as advised.  When to seek medical care  Call your healthcare provider right away if you have any of the  following:  · Signs of bladder infection (urinating more often than usual, or burning, pain, bleeding, or hesitancy when you urinate)  · Signs of infection around your catheter (redness, swelling, warmth, or drainage)  · Rapid weight loss or weight gain, such as 3 pounds or more in 24 hours or 6 pounds or more in 7 days  · Fever above 100.4°F (38.0°C) or chills  · Muscle aches  · Night sweats  · Very little or no urine output  · Swelling of your hands, legs, or feet  · Back pain  · Abdominal pain  · Extreme tiredness   Date Last Reviewed: 2/1/2017  © 7355-2948 [employer]. [address] [place] [address], [place] [address]. All rights reserved. This information is not intended as a substitute for professional medical care. Always follow your healthcare professional's instructions.

## 2018-01-17 NOTE — DISCHARGE SUMMARY
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 Resolved with fluids SCr improved from 3.7 to 1.5 overnight. Held ACEi. US consistent with chronic medical renal disease. Empiric treatment for UTI with ceftriaxone during stay. Will hold further treatment at discharge. Will call patient with results if positive. Patient advised to keep drinking 8-10 cups of non-ETOH fluids. PCP to continue monitoring CKD.       Benign essential hypertension:  Continuing current home management with Coreg (increased dose as ACEi held). Patient discharged back on home regiment.       Diarrhea  Likely etiology of volume depletion. No further stools during stay. Patient reveals diarrhea has been recurring and rather severe. Occurs once monthly since November for about week. He presented in November with syncope. We recommend GI consult and to start Align probiotic once daily. Patient to see PCP in morning to obtain specialty consult ASAP. He can also obtain stool studies with PCP at onset of diarrhea while waiting for GI visit.       Hepatic steatosis  Elevated liver enzymes:  Patient drinks 2 40oz beers a day. He is advised to stop.  and  at discharge. He will follow up with his PCP.    Myopia - patient has been complaining of blurred vision. Screening at last ER visits found vision at 20/70. He needs to follow up with optometrist.    Consults: None    Final Diagnoses:    Principal Problem: RAMY (acute kidney injury)   Secondary Diagnoses:   Active Hospital Problems    Diagnosis  POA    *RAMY (acute kidney injury) [N17.9]  Yes     History of stroke [Z86.73]  Not Applicable    Diarrhea [R19.7]  Yes    Hepatic steatosis [K76.0]  Yes    Elevated liver enzymes [R74.8]  Yes    Benign essential hypertension [I10]  Yes      Resolved Hospital Problems    Diagnosis Date Resolved POA   No resolved problems to display.       Discharged Condition: stable    Disposition: Home or Self Care    Follow Up/Patient Instructions:     Medications:  Reconciled Home Medications:   Discharge Medication List as of 1/16/2018  2:52 PM      CONTINUE these medications which have CHANGED    Details   Bifidobacterium infantis (ALIGN) 4 mg Cap Take 1 capsule by mouth once daily. Okay to use generic, Starting Tue 1/16/2018, Normal         CONTINUE these medications which have NOT CHANGED    Details   aspirin 81 MG Chew Take 1 tablet (81 mg total) by mouth once daily., Starting 1/23/2017, Until Tue 1/23/18, OTC      carvedilol (COREG) 6.25 MG tablet Take 1 tablet (6.25 mg total) by mouth 2 (two) times daily with meals., Starting Fri 11/25/2016, Until Mon 1/15/2018, Print      lisinopril (PRINIVIL,ZESTRIL) 20 MG tablet Take 1 tablet (20 mg total) by mouth once daily., Starting Fri 11/25/2016, Until Mon 1/15/2018, Print      thiamine 100 MG tablet Take 1 tablet (100 mg total) by mouth once daily., Starting 4/20/2016, Until Discontinued, OTC             Discharge Procedure Orders  Ambulatory referral to Gastroenterology   Referral Priority: Routine Referral Type: Consultation   Referral Reason: Specialty Services Required    Requested Specialty: Gastroenterology    Number of Visits Requested: 1      Call MD for:  temperature >100.4     Call MD for:  persistent nausea and vomiting or diarrhea     Call MD for:  severe uncontrolled pain     Call MD for:  redness, tenderness, or signs of infection (pain, swelling, redness, odor or green/yellow discharge around incision site)     Call MD for:  difficulty breathing or increased cough     Call MD for:  severe persistent headache      Call MD for:  worsening rash     Call MD for:  persistent dizziness, light-headedness, or visual disturbances     Call MD for:  increased confusion or weakness           I spent more than 30 minutes total on this discharge including seeing and examining patient, note writing, reconcilling medications, and discussion with other health providers on team.

## 2020-09-08 PROBLEM — Z71.3 ENCOUNTER FOR DIETARY CONSULTATION: Chronic | Status: ACTIVE | Noted: 2020-09-08

## 2020-09-08 PROBLEM — M62.81 ACUTE LEFT-SIDED MUSCLE WEAKNESS: Status: ACTIVE | Noted: 2020-09-08

## 2020-09-08 PROBLEM — I63.311 THROMBOTIC STROKE INVOLVING RIGHT MIDDLE CEREBRAL ARTERY: Status: ACTIVE | Noted: 2020-09-08

## 2020-09-08 PROBLEM — I63.9 CEREBROVASCULAR ACCIDENT (CVA): Status: ACTIVE | Noted: 2020-09-08

## 2021-12-14 NOTE — ED NOTES
Passes JERRY screening   Render In Strict Bullet Format?: No Plan: Follow up in 3 months Initiate Treatment: - \\n- Resume prednisone 10 mg: take 3 tabs (30 mg total) every morning x 1 week , then take 2 tabs (20 mg total) every morning x 1 week, then take 1 tab (10 mg total) every morning x 1 week.\\n- Pending insurance coverage, plan to start Dupixent.(Enrollment forms signed by patient/initiated today) Detail Level: Zone Continue Regimen: - \\n- tacrolimus ointment: apply a thin layer to affected areas of the hands and feet twice a day on the off week from mometasone. (Defers refills today) \\n- mometasone ointment: apply a thin layer to affected areas of the hands and feet up to twice a day as needed for flares. Use for 2 weeks on and 1 week off. (Defers refills today)\\n- Gentle skin care with CeraVe cleanser and CeraVe moisturizer.